# Patient Record
Sex: FEMALE | Race: OTHER | Employment: UNEMPLOYED | URBAN - METROPOLITAN AREA
[De-identification: names, ages, dates, MRNs, and addresses within clinical notes are randomized per-mention and may not be internally consistent; named-entity substitution may affect disease eponyms.]

---

## 2022-03-20 PROBLEM — M16.12 ARTHRITIS OF LEFT HIP: Status: ACTIVE | Noted: 2018-09-24

## 2024-01-11 ENCOUNTER — TELEPHONE (OUTPATIENT)
Dept: PRIMARY CARE CLINIC | Age: 89
End: 2024-01-11

## 2024-01-11 ENCOUNTER — OFFICE VISIT (OUTPATIENT)
Dept: PRIMARY CARE CLINIC | Age: 89
End: 2024-01-11
Payer: MEDICARE

## 2024-01-11 VITALS
TEMPERATURE: 97.8 F | HEIGHT: 66 IN | RESPIRATION RATE: 16 BRPM | DIASTOLIC BLOOD PRESSURE: 64 MMHG | BODY MASS INDEX: 21.49 KG/M2 | HEART RATE: 66 BPM | SYSTOLIC BLOOD PRESSURE: 134 MMHG | OXYGEN SATURATION: 96 % | WEIGHT: 133.7 LBS

## 2024-01-11 DIAGNOSIS — M16.12 ARTHRITIS OF LEFT HIP: Chronic | ICD-10-CM

## 2024-01-11 DIAGNOSIS — L91.8 ACROCHORDON: ICD-10-CM

## 2024-01-11 DIAGNOSIS — S81.802A WOUND OF LEFT LOWER EXTREMITY, INITIAL ENCOUNTER: ICD-10-CM

## 2024-01-11 DIAGNOSIS — Z91.81 AT HIGH RISK FOR FALLS: ICD-10-CM

## 2024-01-11 DIAGNOSIS — Z71.89 HEARING AID CONSULTATION: ICD-10-CM

## 2024-01-11 DIAGNOSIS — R54 FRAIL ELDERLY: ICD-10-CM

## 2024-01-11 DIAGNOSIS — I10 ESSENTIAL HYPERTENSION: Chronic | ICD-10-CM

## 2024-01-11 DIAGNOSIS — I48.20 CHRONIC ATRIAL FIBRILLATION (HCC): Primary | Chronic | ICD-10-CM

## 2024-01-11 PROBLEM — M54.50 LOW BACK PAIN: Status: ACTIVE | Noted: 2020-07-06

## 2024-01-11 PROBLEM — Z96.642 PRESENCE OF LEFT ARTIFICIAL HIP JOINT: Status: ACTIVE | Noted: 2018-09-27

## 2024-01-11 PROBLEM — S00.83XA CONTUSION OF FACE: Status: ACTIVE | Noted: 2021-12-15

## 2024-01-11 PROBLEM — R00.0 TACHYCARDIA: Status: RESOLVED | Noted: 2022-08-29 | Resolved: 2024-01-11

## 2024-01-11 PROBLEM — R42 DIZZINESS AND GIDDINESS: Status: ACTIVE | Noted: 2020-07-06

## 2024-01-11 PROBLEM — M25.512 ACUTE PAIN OF LEFT SHOULDER: Status: RESOLVED | Noted: 2020-02-04 | Resolved: 2024-01-11

## 2024-01-11 PROBLEM — M25.512 ACUTE PAIN OF LEFT SHOULDER: Status: ACTIVE | Noted: 2020-02-04

## 2024-01-11 PROBLEM — S00.83XA CONTUSION OF FACE: Status: RESOLVED | Noted: 2021-12-15 | Resolved: 2024-01-11

## 2024-01-11 PROBLEM — R00.0 TACHYCARDIA: Status: ACTIVE | Noted: 2022-08-29

## 2024-01-11 PROBLEM — R42 DIZZINESS AND GIDDINESS: Status: RESOLVED | Noted: 2020-07-06 | Resolved: 2024-01-11

## 2024-01-11 PROBLEM — M54.50 LOW BACK PAIN: Chronic | Status: ACTIVE | Noted: 2020-07-06

## 2024-01-11 PROCEDURE — 1090F PRES/ABSN URINE INCON ASSESS: CPT | Performed by: FAMILY MEDICINE

## 2024-01-11 PROCEDURE — 1123F ACP DISCUSS/DSCN MKR DOCD: CPT | Performed by: FAMILY MEDICINE

## 2024-01-11 PROCEDURE — G8420 CALC BMI NORM PARAMETERS: HCPCS | Performed by: FAMILY MEDICINE

## 2024-01-11 PROCEDURE — 1036F TOBACCO NON-USER: CPT | Performed by: FAMILY MEDICINE

## 2024-01-11 PROCEDURE — G8427 DOCREV CUR MEDS BY ELIG CLIN: HCPCS | Performed by: FAMILY MEDICINE

## 2024-01-11 PROCEDURE — 99204 OFFICE O/P NEW MOD 45 MIN: CPT | Performed by: FAMILY MEDICINE

## 2024-01-11 PROCEDURE — G8482 FLU IMMUNIZE ORDER/ADMIN: HCPCS | Performed by: FAMILY MEDICINE

## 2024-01-11 RX ORDER — AMIODARONE HYDROCHLORIDE 200 MG/1
200 TABLET ORAL DAILY
COMMUNITY
End: 2024-01-11 | Stop reason: SDUPTHER

## 2024-01-11 RX ORDER — DOCUSATE SODIUM 100 MG/1
100 CAPSULE, LIQUID FILLED ORAL 2 TIMES DAILY
COMMUNITY
Start: 2023-12-20 | End: 2024-01-11 | Stop reason: SDUPTHER

## 2024-01-11 RX ORDER — METOPROLOL SUCCINATE 25 MG/1
25 TABLET, EXTENDED RELEASE ORAL DAILY
Qty: 90 TABLET | Refills: 1 | Status: SHIPPED | OUTPATIENT
Start: 2024-01-11

## 2024-01-11 RX ORDER — RIVAROXABAN 20 MG/1
20 TABLET, FILM COATED ORAL DAILY
COMMUNITY
Start: 2022-08-30 | End: 2024-01-11 | Stop reason: SDUPTHER

## 2024-01-11 RX ORDER — DILTIAZEM HYDROCHLORIDE 120 MG/1
120 TABLET, FILM COATED ORAL 2 TIMES DAILY
Qty: 120 TABLET | Refills: 2 | Status: SHIPPED | OUTPATIENT
Start: 2024-01-11 | End: 2024-01-11 | Stop reason: ALTCHOICE

## 2024-01-11 RX ORDER — RIVAROXABAN 20 MG/1
20 TABLET, FILM COATED ORAL DAILY
Qty: 90 TABLET | Refills: 1 | Status: SHIPPED | OUTPATIENT
Start: 2024-01-11

## 2024-01-11 RX ORDER — DOCUSATE SODIUM 100 MG/1
100 CAPSULE, LIQUID FILLED ORAL 2 TIMES DAILY
Qty: 60 CAPSULE | Refills: 2 | Status: SHIPPED | OUTPATIENT
Start: 2024-01-11

## 2024-01-11 RX ORDER — AMIODARONE HYDROCHLORIDE 200 MG/1
200 TABLET ORAL DAILY
Qty: 90 TABLET | Refills: 1 | Status: SHIPPED | OUTPATIENT
Start: 2024-01-11

## 2024-01-11 RX ORDER — TRAMADOL HYDROCHLORIDE 50 MG/1
50 TABLET ORAL EVERY 8 HOURS PRN
COMMUNITY

## 2024-01-11 RX ORDER — DILTIAZEM HYDROCHLORIDE 120 MG/1
120 TABLET, FILM COATED ORAL 2 TIMES DAILY
COMMUNITY
Start: 2023-12-20 | End: 2024-01-11 | Stop reason: SDUPTHER

## 2024-01-11 RX ORDER — CEPHALEXIN 500 MG/1
500 CAPSULE ORAL 2 TIMES DAILY
Qty: 10 CAPSULE | Refills: 0 | Status: SHIPPED | OUTPATIENT
Start: 2024-01-11 | End: 2024-01-16

## 2024-01-11 RX ORDER — METOPROLOL SUCCINATE 25 MG/1
25 TABLET, EXTENDED RELEASE ORAL DAILY
COMMUNITY
Start: 2022-08-30 | End: 2024-01-11 | Stop reason: SDUPTHER

## 2024-01-11 RX ORDER — DILTIAZEM HYDROCHLORIDE 120 MG/1
120 CAPSULE, COATED, EXTENDED RELEASE ORAL DAILY
COMMUNITY
End: 2024-01-11

## 2024-01-11 RX ORDER — DILTIAZEM HYDROCHLORIDE 120 MG/1
120 CAPSULE, EXTENDED RELEASE ORAL DAILY
Qty: 90 CAPSULE | Refills: 1 | Status: SHIPPED | OUTPATIENT
Start: 2024-01-11

## 2024-01-11 SDOH — ECONOMIC STABILITY: HOUSING INSECURITY
IN THE LAST 12 MONTHS, WAS THERE A TIME WHEN YOU DID NOT HAVE A STEADY PLACE TO SLEEP OR SLEPT IN A SHELTER (INCLUDING NOW)?: NO

## 2024-01-11 SDOH — ECONOMIC STABILITY: INCOME INSECURITY: HOW HARD IS IT FOR YOU TO PAY FOR THE VERY BASICS LIKE FOOD, HOUSING, MEDICAL CARE, AND HEATING?: NOT HARD AT ALL

## 2024-01-11 SDOH — ECONOMIC STABILITY: FOOD INSECURITY: WITHIN THE PAST 12 MONTHS, YOU WORRIED THAT YOUR FOOD WOULD RUN OUT BEFORE YOU GOT MONEY TO BUY MORE.: NEVER TRUE

## 2024-01-11 SDOH — ECONOMIC STABILITY: FOOD INSECURITY: WITHIN THE PAST 12 MONTHS, THE FOOD YOU BOUGHT JUST DIDN'T LAST AND YOU DIDN'T HAVE MONEY TO GET MORE.: NEVER TRUE

## 2024-01-11 ASSESSMENT — PATIENT HEALTH QUESTIONNAIRE - PHQ9
SUM OF ALL RESPONSES TO PHQ QUESTIONS 1-9: 1
SUM OF ALL RESPONSES TO PHQ9 QUESTIONS 1 & 2: 1
SUM OF ALL RESPONSES TO PHQ QUESTIONS 1-9: 1
SUM OF ALL RESPONSES TO PHQ QUESTIONS 1-9: 1
2. FEELING DOWN, DEPRESSED OR HOPELESS: 1
1. LITTLE INTEREST OR PLEASURE IN DOING THINGS: 0
SUM OF ALL RESPONSES TO PHQ QUESTIONS 1-9: 1

## 2024-01-11 ASSESSMENT — ENCOUNTER SYMPTOMS
SHORTNESS OF BREATH: 0
DIARRHEA: 0
SORE THROAT: 0
ABDOMINAL PAIN: 0
CHEST TIGHTNESS: 0
RHINORRHEA: 0
CONSTIPATION: 0

## 2024-01-11 NOTE — PROGRESS NOTES
Subjective:   Patient ID: Roxanne Best is a 89 y.o. female here today to establish care. Previously under the care of [facility].  HPI by clinical support staff:   Chief Complaint   Patient presents with    New Patient      Preliminary data above this line collected by clinical support staff.    ______________________________________________________________________  HPI by Provider:   HPI   Patient presents to establish care.  History reviewed and updated with patient today.  Patient brought in by  sister and brother in law to establish care- moved to be closer to family.  Diagnosis of atrial fibrillation and hypertension- both well controlled.   Fell  2 days ago while turning around with her walker and has a laceration on her shin - left.  Chronic low back pain from arthritis.   Data above this line collected by Provider.    Patient's medications, allergies, past medical, surgical, social and family histories were reviewed and updated as appropriate.  Patient Care Team:  Cheryl James MD as PCP - General (Family Medicine)    No Known Allergies    Current Outpatient Medications on File Prior to Visit   Medication Sig Dispense Refill    traMADol (ULTRAM) 50 MG tablet Take 1 tablet by mouth every 8 hours as needed for Pain.       No current facility-administered medications on file prior to visit.       Review of Systems   Constitutional:  Negative for activity change, appetite change, fatigue and fever.   HENT:  Negative for congestion, rhinorrhea and sore throat.    Respiratory:  Negative for chest tightness and shortness of breath.    Cardiovascular:  Negative for chest pain, palpitations and leg swelling.   Gastrointestinal:  Negative for abdominal pain, constipation and diarrhea.   Genitourinary:  Negative for dysuria and frequency.   Musculoskeletal:  Negative for arthralgias.   Neurological:  Negative for dizziness, weakness and headaches.   Psychiatric/Behavioral:  Negative for hallucinations.    All

## 2024-01-11 NOTE — TELEPHONE ENCOUNTER
Pharmacy called in regarding the medication dilTiazem tablets that you ordered will not give the long lasting coverage, so  the pharmacist said that looking back at her previous record they had been giving her the CD capsule Extended release that gave her the lasting coverage that she needs.

## 2024-01-12 ENCOUNTER — TELEPHONE (OUTPATIENT)
Dept: PRIMARY CARE CLINIC | Age: 89
End: 2024-01-12

## 2024-01-12 NOTE — TELEPHONE ENCOUNTER
[facility] pharmacy called stating that the prescription that was re-sent was received for:  dilTIAZem (DILACOR XR) 120 MG extended release capsule  But was sent for 1 tab daily, where prior script was for 1 tab BID.   Please advise

## 2024-01-16 ENCOUNTER — TELEPHONE (OUTPATIENT)
Dept: PRIMARY CARE CLINIC | Age: 89
End: 2024-01-16

## 2024-01-16 ENCOUNTER — CARE COORDINATION (OUTPATIENT)
Dept: CARE COORDINATION | Age: 89
End: 2024-01-16

## 2024-01-16 DIAGNOSIS — G89.29 CHRONIC BILATERAL LOW BACK PAIN WITHOUT SCIATICA: Primary | Chronic | ICD-10-CM

## 2024-01-16 DIAGNOSIS — R54 FRAIL ELDERLY: ICD-10-CM

## 2024-01-16 DIAGNOSIS — M54.50 CHRONIC BILATERAL LOW BACK PAIN WITHOUT SCIATICA: Primary | Chronic | ICD-10-CM

## 2024-01-16 SDOH — ECONOMIC STABILITY: HOUSING INSECURITY: IN THE LAST 12 MONTHS, HOW MANY PLACES HAVE YOU LIVED?: 2

## 2024-01-16 SDOH — ECONOMIC STABILITY: TRANSPORTATION INSECURITY
IN THE PAST 12 MONTHS, HAS THE LACK OF TRANSPORTATION KEPT YOU FROM MEDICAL APPOINTMENTS OR FROM GETTING MEDICATIONS?: NO

## 2024-01-16 SDOH — ECONOMIC STABILITY: TRANSPORTATION INSECURITY
IN THE PAST 12 MONTHS, HAS LACK OF TRANSPORTATION KEPT YOU FROM MEETINGS, WORK, OR FROM GETTING THINGS NEEDED FOR DAILY LIVING?: NO

## 2024-01-16 SDOH — ECONOMIC STABILITY: INCOME INSECURITY: IN THE LAST 12 MONTHS, WAS THERE A TIME WHEN YOU WERE NOT ABLE TO PAY THE MORTGAGE OR RENT ON TIME?: NO

## 2024-01-16 NOTE — TELEPHONE ENCOUNTER
Pt would like advice on where she should get home health care services    Please call back and advise

## 2024-01-16 NOTE — TELEPHONE ENCOUNTER
Referral/last office note/med list/allergy list/insurance cards have been faxed over to the number provided

## 2024-01-16 NOTE — TELEPHONE ENCOUNTER
Spoke with Josee.  I informed that she can contact the insurance to find out who she would need to use and to let us know if they needs orders/notes faxed to them.

## 2024-01-16 NOTE — CARE COORDINATION
Ambulatory Care Coordination Note  2024    Patient Current Location:  Home: [address] [place] [address].  [facility] [id number]    ACM contacted the family by telephone. Verified name and  with family as identifiers. Provided introduction to self, and explanation of the ACM role.     ACM: Anai Medley RN    Challenges to be reviewed by the provider   Additional needs identified to be addressed with provider: Yes  Patient family request HHC order be sent to [facility] in [place]. Please have office send order, kacie, jose m to [phone].     Please mail HIPAA from to patient so we are able to speak with her sister Josee. Verbal taken for this call today           Method of communication with provider: chart routing.  ACM outreach to patient to introduce her to care coordination and the role of the ACM. Patient notes she prefers her sister Josee to speak with ACM. No current HIPAA on file. Verbal to speak with sister. Will have office mail HIPAA form to patients address to be completed by patient.     Patient is a 89 y.o. who recently moved in with family after being at [facility]. Patient has had many falls recently and has a wound on her shin. Patient is noted to be weak and unable to preform ADL's. iADLs independently.  The family has inquired about HHC, PT, OT, SN and HHA. The outreached [facility] in [place] and provided ACM the fax number for the office to send order. AC discussed with patients sister Josee that if they do no accept the patients insurance are they open to other [facility] agency's. Patient sister notes she is open to other referrals if [facility] can not accept patient.   Josee also notes that patient is currently not active with any senior services, she is open to a referral for senior services.   She notes patient does have living will but only her son who lives in [place] is on this. Will have  outreach to discuss the need to have updated forms and referral to

## 2024-01-16 NOTE — CARE COORDINATION
Per [facility] patient is looking for someone to repair walker/rollator that is only 1 year old.    Per notes in Chart Rollator may have come from [facility]. July 2023  Called [facility] to see if Rollator could be repaired or replaced by them. Spoke with Kortney. Family/patient would need to bring into office in [place] to have them take a look at it to see if they could repair. She said the rollator was received in Dec 2023.    [place] [id number] [phone]  Fax [phone]      Routed to [facility].

## 2024-01-18 ENCOUNTER — CARE COORDINATION (OUTPATIENT)
Dept: CARE COORDINATION | Age: 89
End: 2024-01-18

## 2024-01-18 NOTE — CARE COORDINATION
CHANDNI placed call to patient's sister, Josee, to discuss community resource needs.   Josee confirmed that patient would like to update AD's. CHANDNI to send mail request to [facility] on this date. CHANDNI to assist in completion and answer questions as needed.     Josee stated patient's primary needs are for skilled services such as PT and nursing for wound care. She stated she is expecting a call from someone at [facility] between 430-5 PM today to schedule start of care.   Josee reports that patient does not need help with personal care tasks such as bathing or dressing herself, but does need help with foot/toenail care. She is in agreement for this worker to make a referral to [facility] for assistance with grooming and homemaking tasks. Referral ref # [id number].    CHANDNI provided Josee with the contact number for [facility], as noted by [facility], to discuss repair of walker.     CHANDNI to follow up with patient/Josee next week regarding outcome of [facility] referral and receipt of AD's.      Princess Medley MSW, LSW     [phone]

## 2024-01-23 ENCOUNTER — OFFICE VISIT (OUTPATIENT)
Dept: CARDIOLOGY CLINIC | Age: 89
End: 2024-01-23
Payer: MEDICARE

## 2024-01-23 VITALS
BODY MASS INDEX: 20.5 KG/M2 | SYSTOLIC BLOOD PRESSURE: 124 MMHG | HEART RATE: 54 BPM | DIASTOLIC BLOOD PRESSURE: 70 MMHG | WEIGHT: 127 LBS

## 2024-01-23 DIAGNOSIS — Z95.0 PACEMAKER: ICD-10-CM

## 2024-01-23 DIAGNOSIS — I48.20 CHRONIC ATRIAL FIBRILLATION (HCC): Primary | Chronic | ICD-10-CM

## 2024-01-23 DIAGNOSIS — I10 ESSENTIAL HYPERTENSION: Chronic | ICD-10-CM

## 2024-01-23 PROCEDURE — 1123F ACP DISCUSS/DSCN MKR DOCD: CPT | Performed by: INTERNAL MEDICINE

## 2024-01-23 PROCEDURE — G8482 FLU IMMUNIZE ORDER/ADMIN: HCPCS | Performed by: INTERNAL MEDICINE

## 2024-01-23 PROCEDURE — 1090F PRES/ABSN URINE INCON ASSESS: CPT | Performed by: INTERNAL MEDICINE

## 2024-01-23 PROCEDURE — G8420 CALC BMI NORM PARAMETERS: HCPCS | Performed by: INTERNAL MEDICINE

## 2024-01-23 PROCEDURE — 93000 ELECTROCARDIOGRAM COMPLETE: CPT | Performed by: INTERNAL MEDICINE

## 2024-01-23 PROCEDURE — 99204 OFFICE O/P NEW MOD 45 MIN: CPT | Performed by: INTERNAL MEDICINE

## 2024-01-23 PROCEDURE — G8427 DOCREV CUR MEDS BY ELIG CLIN: HCPCS | Performed by: INTERNAL MEDICINE

## 2024-01-23 PROCEDURE — 1036F TOBACCO NON-USER: CPT | Performed by: INTERNAL MEDICINE

## 2024-01-23 ASSESSMENT — ENCOUNTER SYMPTOMS
VOMITING: 0
CHEST TIGHTNESS: 0
COUGH: 0
BACK PAIN: 0
COLOR CHANGE: 0
EYE DISCHARGE: 0
SHORTNESS OF BREATH: 0
BLOOD IN STOOL: 0
FACIAL SWELLING: 0
WHEEZING: 0
ABDOMINAL DISTENTION: 0
ABDOMINAL PAIN: 0

## 2024-01-23 NOTE — PROGRESS NOTES
140/60 124/70   Site: Left Upper Arm    Position: Sitting    Cuff Size: Medium Adult    Pulse: 60 54   Weight: 57.6 kg (127 lb)        BP Readings from Last 3 Encounters:   01/23/24 124/70   01/11/24 134/64       Wt Readings from Last 3 Encounters:   01/23/24 57.6 kg (127 lb)   01/11/24 60.6 kg (133 lb 11.2 oz)       Physical Exam  Constitutional:       General: She is not in acute distress.     Appearance: She is well-developed. She is not diaphoretic.   HENT:      Head: Normocephalic and atraumatic.   Eyes:      Pupils: Pupils are equal, round, and reactive to light.   Neck:      Thyroid: No thyromegaly.      Vascular: No JVD.   Cardiovascular:      Rate and Rhythm: Normal rate and regular rhythm.      Chest Wall: PMI is not displaced.      Heart sounds: Normal heart sounds, S1 normal and S2 normal. No murmur heard.     No friction rub. No gallop.   Pulmonary:      Effort: Pulmonary effort is normal. No respiratory distress.      Breath sounds: Normal breath sounds. No stridor. No wheezing or rales.   Chest:      Chest wall: No tenderness.   Abdominal:      General: Bowel sounds are normal. There is no distension.      Palpations: Abdomen is soft.      Tenderness: There is no abdominal tenderness. There is no guarding or rebound.   Musculoskeletal:         General: No tenderness. Normal range of motion.      Cervical back: Normal range of motion.   Lymphadenopathy:      Cervical: No cervical adenopathy.   Skin:     General: Skin is warm and dry.      Findings: No erythema or rash.   Neurological:      Mental Status: She is alert and oriented to person, place, and time.      Coordination: Coordination normal.   Psychiatric:         Behavior: Behavior normal.         Thought Content: Thought content normal.         Judgment: Judgment normal.         Labs:       No results found for: \"WBC\", \"HGB\", \"HCT\", \"MCV\", \"PLT\"  No results found for: \"NA\", \"K\", \"CL\", \"CO2\", \"BUN\", \"CREATININE\", \"GLUCOSE\", \"CALCIUM\", \"PROT\",

## 2024-01-23 NOTE — ASSESSMENT & PLAN NOTE
Doing well on Xarelto.  No bleeding.  No falls.  Heart rate is controlled on metoprolol diltiazem and amiodarone.  Pacemaker working properly.

## 2024-01-24 ENCOUNTER — CARE COORDINATION (OUTPATIENT)
Dept: CASE MANAGEMENT | Age: 89
End: 2024-01-24

## 2024-01-24 NOTE — CARE COORDINATION
Ambulatory Care Coordination Note  1/24/2024      Attempted to contact patient for follow up transition call. Left voicemail message to return call with an update on condition since discharge. Contact information provided. Will continue to follow up.      Lab Results       None            Care Coordination Interventions    Referral from Primary Care Provider: Yes  Suggested Interventions and Community Resources  Home Health Services: In Process  Meals on Wheels: Declined  Medication Assistance Program: Declined  Medi Set or Pill Pack: Declined  Occupational Therapy: In Process  Palliative Care: Not Started  Physical Therapy: In Process  Senior Services: In Process  Social Work: In Process  Zone Management Tools: In Process (Comment: HTN)          Goals Addressed    None         Future Appointments   Date Time Provider Department Center   2/23/2024  1:00 PM Carmen Smith AuD KW AUDIO [facility]   3/11/2024  1:30 PM SCHEDULE, RANDALL DEVICE CHECK [facility]   3/11/2024  2:00 PM Kd Haines MD [place] [facility]   3/26/2024 11:00 AM Cheryl James MD [facility] PC & PEDS [place] - [facility]   1/28/2025 12:00 PM Adam Karimi MD [place] CARDIO [facility]       Lea Rush LPJOANIE  Care Coordinator  [phone]

## 2024-01-27 DIAGNOSIS — M16.12 ARTHRITIS OF LEFT HIP: Chronic | ICD-10-CM

## 2024-01-29 ENCOUNTER — CARE COORDINATION (OUTPATIENT)
Dept: CARE COORDINATION | Age: 89
End: 2024-01-29

## 2024-01-29 RX ORDER — TRAMADOL HYDROCHLORIDE 50 MG/1
50 TABLET ORAL 2 TIMES DAILY
Qty: 60 TABLET | Refills: 0 | Status: SHIPPED | OUTPATIENT
Start: 2024-01-29 | End: 2024-02-28

## 2024-01-29 NOTE — CARE COORDINATION
CHANDNI placed follow-up call to Josee to touch base regarding [facility] referral and receipt of AD packet.  A niece of the family, Rosemarie [phone], answered Josee's phone stating that Josee passed away last night. Rosemarie is Josee's daughter and she stated that Josee's phone will be managed by her spouse, Dino. They intend to keep this number active.     Rosemarie will follow-up with Bill regarding AD packet when able. Patient's HHC SOC was scheduled for this week, but was delayed due to Josee's passing.  SW to research traveling podiatrists to see patient at home to complete foot/toenail care.    SW offered condolences and encouraged her to let this worker know if patient/family needs anything.     CHANDNI located podiatrist options and left a voicemail with information:  Dr Jaqui Castañeda [phone]   [facility] [phone] Dr. Amanda Medley MSW, LSW     [phone]

## 2024-02-09 ENCOUNTER — CARE COORDINATION (OUTPATIENT)
Dept: CARE COORDINATION | Age: 89
End: 2024-02-09

## 2024-02-09 NOTE — CARE COORDINATION
CHANDNI placed follow-up call to Dino who stated he is unsure if AD packet was received. He stated that patient's niece will likely take over POA as she is local and patient's son is not. He is unsure if [facility] has reached out. He took this worker's number down and will have his daughter, Rosemarie, reach out to this worker.     Princess Medley MSW, LSW     [phone]

## 2024-02-16 ENCOUNTER — CARE COORDINATION (OUTPATIENT)
Dept: CARE COORDINATION | Age: 89
End: 2024-02-16

## 2024-02-20 ENCOUNTER — CARE COORDINATION (OUTPATIENT)
Dept: CARE COORDINATION | Age: 89
End: 2024-02-20

## 2024-02-20 NOTE — CARE COORDINATION
CHANDNI placed follow up call to Dino who stated patient is doing well. They located a podiatrist in [place] to care for patient. She has an ear appointment this Friday. [facility] services have started and the therapist is scheduled to come out today.   Dino stated that his daughter Rosemarie is coordinating POA paperwork with patient's son who lives in [place]. Dino reports no questions or concerns at this time. CHANDNI encouraged him to call if any needs arise. Will sign off.    Princess Medley MSW, LSW     [phone]

## 2024-02-22 ENCOUNTER — CARE COORDINATION (OUTPATIENT)
Dept: CARE COORDINATION | Age: 89
End: 2024-02-22

## 2024-02-22 NOTE — CARE COORDINATION
Ambulatory Care Coordination Note  2024    Patient Current Location:  Home: [address] [place] [address].  [facility] [address]     ACM contacted the patient by telephone. Verified name and  with patient as identifiers. Provided introduction to self, and explanation of the ACM role.     Challenges to be reviewed by the provider   Additional needs identified to be addressed with provider: No  none               Method of communication with provider: none.    ACM: Elizabeth Santos RN    ACM outreach made. Spoke with patient's son who states that patient is doing well. HHC was there today and VSS were stable. Bill, patient's son, states that PT comes in once a week. States that everyone ([facility]/senior services) has been in contact with them. States no needs or concerns at this time. Audiology appt tomorrow. Cardiologist appt 3/11/24.    Plan   Follow up 1-2 weeks  Assess needs    Offered patient enrollment in the Remote Patient Monitoring (RPM) program for in-home monitoring: Patient declined.    Lab Results       None            Care Coordination Interventions    Referral from Primary Care Provider: Yes  Suggested Interventions and Community Resources  Home Health Services: In Process  Meals on Wheels: Declined  Medication Assistance Program: Declined  Medi Set or Pill Pack: Declined  Occupational Therapy: In Process  Palliative Care: Not Started  Physical Therapy: In Process  Senior Services: In Process  Social Work: In Process  Zone Management Tools: In Process (Comment: HTN)          Goals Addressed    None         Future Appointments   Date Time Provider Department Center   2024  1:00 PM Carmen Smith AuD KW AUDIO [facility]   3/11/2024  1:30 PM SCHEDULE, RANDALL DEVICE CHECK [place] [facility]   3/11/2024  2:00 PM Kd Haines MD [place] [facility]   3/26/2024 11:00 AM Cheryl James MD WC PC & PEDS [place] - DYD   2025 12:00 PM Adam Karimi MD [place] CARDIO [facility]   ,   Hypertension - Encounter

## 2024-02-23 ENCOUNTER — PROCEDURE VISIT (OUTPATIENT)
Dept: AUDIOLOGY | Age: 89
End: 2024-02-23

## 2024-02-23 DIAGNOSIS — H90.3 SENSORINEURAL HEARING LOSS (SNHL) OF BOTH EARS: Primary | ICD-10-CM

## 2024-02-23 NOTE — PROGRESS NOTES
Roxanne Best   10/31/1925, 89 y.o. female   [id number]       Referring Provider: Cheryl James MD   Referral Type: In an order in Epic    Reason for Visit: Evaluation of suspected change in hearing, tinnitus, or balance.      ADULT AUDIOLOGIC EVALUATION      Roxanne Best is a 89 y.o. female seen today, 2/23/2024, for an initial audiologic evaluation.  Patient was seen accompanied by her niece.    AUDIOLOGIC AND OTHER PERTINENT MEDICAL HISTORY:        Roxanne Best noted decreased hearing bilaterally, gradual; she noted concern for ear wax and was under the impression this would be removed today; some fullness in ears, believes it may be wax; she does use a walker to assist with ambulation, has a history of hip fracture two years ago.      Roxanne eBst denied otalgia and otorrhea.    IMPRESSIONS:       Today's results are consistent with bilateral sensorineural hearing loss; right ear with excellent  word recognition and normal middle ear function and left ear with good word recognition and reduced TM mobility.  Hearing loss is significant enough to result in difficulty understanding speech in most listening environments.  Recommended ear cleaning with ENT and hearing aid evaluation.  She plans to discuss hearing aids with a family member that dispenses hearing aids.    ASSESSMENT AND FINDINGS:       Otoscopy revealed: RE>LE cerumen, could not visualize right TM.  At completion of testing, patient vocalized consent and attempted cerumen removal utilizing a curette today.  She did not tolerate this well and was discontinued.  Recommended formal ear cleaning with ENT.      RIGHT EAR:  Hearing Sensitivity: Mild through 1000 Hz steeply sloping to profound sensorineural hearing loss with no response at the limits of the equipment 3926-9883 Hz.  Speech Recognition Threshold: 45 dBHL  Word Recognition: Excellent (92%), based on NU-6 25-word list at 80 dBHL, masked, using recorded speech stimuli.    Tympanometry:

## 2024-02-23 NOTE — PATIENT INSTRUCTIONS
back and forth on the telephone instead of talking or listening. These devices are also called TDD. When messages are typed on the keyboard, they are sent over the phone line to a receiving TTY. The message is shown on a monitor.  Use pagers, fax machines, text, and email if it is hard for you to communicate by telephone.  Try to learn a listening technique called speech-reading. It is not lip-reading. You pay attention to people's gestures, expressions, posture, and tone of voice. These clues can help you understand what a person is saying. Face the person you are talking to, and have him or her face you. Make sure the lighting is good. You need to see the other person's face clearly.  Think about counseling if you need help to adjust to your hearing loss.    When should you call for help?  Watch closely for changes in your health, and be sure to contact your doctor if:    You think your hearing is getting worse.     You have new symptoms, such as dizziness or nausea.

## 2024-02-27 NOTE — PROGRESS NOTES
[facility]   Cardiac Electrophysiology Consultation   Date: 3/11/2024  Reason for Consultation:   Consult Requesting Physician: No att. providers found     Chief Complaint:   Chief Complaint   Patient presents with    New Patient     Establish care         HPI: Roxanne Best is a 89 y.o. female referred by Dr. Karimi to establish care with EP. PMH significant for HTN, depression, arthritis, frequent falls, SSS, s/p dual chamber MDT PPM (7/12/23, Dr. Bansal), chronic AF on Xarelto and amiodarone.    Interval History:   Today, she is here to establish care with EP for device management and AF as she recently moved from [place], [place]. She feels well overall. Denies complaints of palpitations, dizziness, CP, SOB, orthopnea, BLE swelling, or syncope. She takes her medications without side effects. She fell recently and broke her femur, using a walker for now. She reports having a couple of DCCVs in the past.    All sensing and pacing parameters appear stable since 08.25.2023. 12.2 yrs estimated until CONRADO/RRT. Underlying AsVs, SB, 40s. AP 95.5%  9.5%. PVC 0.2/hr.   She declines remote monitoring and wants OV checks only.    Assessment:  Chronic AF, on Xarelto, amiodarone, Toprol, diltiazem  SSS, s/p dual chamber PPM  HTN, stable on Toprol, diltiazem, follows Dr. Karimi    Plan:  No changes to current medications or with device settings  Will plan for in office device checks every 3 months  Continue to f/u with Dr. Karimi as planned  Follow up with EP NP in 6 months    Past Medical History:   Diagnosis Date    A-fib (HCC)     Allergic rhinitis     Depression     Hypertension         Past Surgical History:   Procedure Laterality Date    JOINT REPLACEMENT  2019       Allergies:  No Known Allergies    Medication:   Prior to Admission medications    Medication Sig Start Date End Date Taking? Authorizing Provider   traMADol (ULTRAM) 50 MG tablet Take 1 tablet by mouth every 6 hours as needed for Pain for up to

## 2024-03-06 ENCOUNTER — CARE COORDINATION (OUTPATIENT)
Dept: CARE COORDINATION | Age: 89
End: 2024-03-06

## 2024-03-06 ENCOUNTER — OFFICE VISIT (OUTPATIENT)
Dept: PRIMARY CARE CLINIC | Age: 89
End: 2024-03-06
Payer: MEDICARE

## 2024-03-06 VITALS
RESPIRATION RATE: 16 BRPM | HEIGHT: 66 IN | BODY MASS INDEX: 20.89 KG/M2 | HEART RATE: 67 BPM | TEMPERATURE: 98.2 F | SYSTOLIC BLOOD PRESSURE: 122 MMHG | OXYGEN SATURATION: 96 % | DIASTOLIC BLOOD PRESSURE: 74 MMHG | WEIGHT: 130 LBS

## 2024-03-06 DIAGNOSIS — G89.29 CHRONIC THORACIC BACK PAIN, UNSPECIFIED BACK PAIN LATERALITY: ICD-10-CM

## 2024-03-06 DIAGNOSIS — Z00.00 INITIAL MEDICARE ANNUAL WELLNESS VISIT: Primary | ICD-10-CM

## 2024-03-06 DIAGNOSIS — M54.6 CHRONIC THORACIC BACK PAIN, UNSPECIFIED BACK PAIN LATERALITY: ICD-10-CM

## 2024-03-06 PROCEDURE — G8482 FLU IMMUNIZE ORDER/ADMIN: HCPCS | Performed by: NURSE PRACTITIONER

## 2024-03-06 PROCEDURE — 1123F ACP DISCUSS/DSCN MKR DOCD: CPT | Performed by: NURSE PRACTITIONER

## 2024-03-06 PROCEDURE — G0438 PPPS, INITIAL VISIT: HCPCS | Performed by: NURSE PRACTITIONER

## 2024-03-06 RX ORDER — TRAMADOL HYDROCHLORIDE 50 MG/1
50 TABLET ORAL EVERY 6 HOURS PRN
Qty: 12 TABLET | Refills: 0 | Status: SHIPPED | OUTPATIENT
Start: 2024-03-06 | End: 2024-04-05

## 2024-03-06 ASSESSMENT — PATIENT HEALTH QUESTIONNAIRE - PHQ9
SUM OF ALL RESPONSES TO PHQ QUESTIONS 1-9: 0
SUM OF ALL RESPONSES TO PHQ9 QUESTIONS 1 & 2: 0
1. LITTLE INTEREST OR PLEASURE IN DOING THINGS: 0
SUM OF ALL RESPONSES TO PHQ QUESTIONS 1-9: 0
2. FEELING DOWN, DEPRESSED OR HOPELESS: 0

## 2024-03-06 ASSESSMENT — LIFESTYLE VARIABLES
HOW MANY STANDARD DRINKS CONTAINING ALCOHOL DO YOU HAVE ON A TYPICAL DAY: PATIENT DOES NOT DRINK
HOW OFTEN DO YOU HAVE A DRINK CONTAINING ALCOHOL: NEVER

## 2024-03-06 NOTE — CARE COORDINATION
ACM outreach made with no answer. Unable to leave VM as mailbox is full. Will attempt at a later date/time.

## 2024-03-06 NOTE — PROGRESS NOTES
Patient Care Team:  Cheryl James MD as PCP - General (Family Medicine)  Cheryl James MD as PCP - Empaneled Provider  Anai Medley, RN as Ambulatory Care Manager  Elizabeth Santos, RN as Registered Nurse     Reviewed and updated this visit:  Tobacco  Allergies  Meds  Problems  Med Hx  Surg Hx  Soc Hx  Fam Hx

## 2024-03-06 NOTE — PATIENT INSTRUCTIONS
and tests that look at your brain.  These questions and tests can make sure you don't have other conditions that can cause symptoms like MCI. These include depression, sleep problems, and side effects from medicines.  How is it treated?  There are no medicines to treat MCI or to keep it from progressing to dementia. But treating conditions like high blood pressure and diabetes may help. A person with MCI needs routine follow-up visits with their doctor to check on changes in the person's mental skills.  How can you care for yourself at home?  Keeping your body active can help slow MCI. Exercises like walking can help. Try to stay active mentally too. Read or do things like crossword puzzles if you enjoy doing them.  If you need help coping with MCI, you may want to get support from family, friends, a support group, or a counselor who works with people who have MCI.  Though the future isn't always clear, it can be good to plan ahead with instructions for your care. These are called advanced directives. Having a plan can help make sure that you get the care you want.  Current as of: May 1, 2023               Content Version: 13.9  © 0934-1333 [employer].   Care instructions adapted under license by [employer]. If you have questions about a medical condition or this instruction, always ask your healthcare professional. [employer] disclaims any warranty or liability for your use of this information.           Learning About Dental Care for Older Adults  Dental care for older adults: Overview  Dental care for older people is much the same as for younger adults. But older adults do have concerns that younger adults do not. Older adults may have problems with gum disease and decay on the roots of their teeth. They may need missing teeth replaced or broken fillings fixed. Or they may have dentures that need to be cared for. Some older adults may have trouble holding a toothbrush.  You can help

## 2024-03-11 ENCOUNTER — NURSE ONLY (OUTPATIENT)
Dept: CARDIOLOGY CLINIC | Age: 89
End: 2024-03-11
Payer: MEDICARE

## 2024-03-11 ENCOUNTER — OFFICE VISIT (OUTPATIENT)
Dept: CARDIOLOGY CLINIC | Age: 89
End: 2024-03-11
Payer: MEDICARE

## 2024-03-11 VITALS
HEART RATE: 62 BPM | WEIGHT: 131.4 LBS | SYSTOLIC BLOOD PRESSURE: 110 MMHG | DIASTOLIC BLOOD PRESSURE: 70 MMHG | BODY MASS INDEX: 21.21 KG/M2

## 2024-03-11 DIAGNOSIS — I49.5 SSS (SICK SINUS SYNDROME) (HCC): ICD-10-CM

## 2024-03-11 DIAGNOSIS — Z95.0 PACEMAKER: ICD-10-CM

## 2024-03-11 DIAGNOSIS — Z95.0 PACEMAKER: Primary | ICD-10-CM

## 2024-03-11 DIAGNOSIS — I48.20 CHRONIC ATRIAL FIBRILLATION (HCC): Primary | ICD-10-CM

## 2024-03-11 DIAGNOSIS — I48.20 CHRONIC ATRIAL FIBRILLATION (HCC): Chronic | ICD-10-CM

## 2024-03-11 DIAGNOSIS — I10 ESSENTIAL HYPERTENSION: ICD-10-CM

## 2024-03-11 PROCEDURE — 93280 PM DEVICE PROGR EVAL DUAL: CPT | Performed by: INTERNAL MEDICINE

## 2024-03-11 PROCEDURE — 1090F PRES/ABSN URINE INCON ASSESS: CPT | Performed by: INTERNAL MEDICINE

## 2024-03-11 PROCEDURE — G8427 DOCREV CUR MEDS BY ELIG CLIN: HCPCS | Performed by: INTERNAL MEDICINE

## 2024-03-11 PROCEDURE — 93000 ELECTROCARDIOGRAM COMPLETE: CPT | Performed by: INTERNAL MEDICINE

## 2024-03-11 PROCEDURE — G8482 FLU IMMUNIZE ORDER/ADMIN: HCPCS | Performed by: INTERNAL MEDICINE

## 2024-03-11 PROCEDURE — 1036F TOBACCO NON-USER: CPT | Performed by: INTERNAL MEDICINE

## 2024-03-11 PROCEDURE — 99204 OFFICE O/P NEW MOD 45 MIN: CPT | Performed by: INTERNAL MEDICINE

## 2024-03-11 PROCEDURE — G8420 CALC BMI NORM PARAMETERS: HCPCS | Performed by: INTERNAL MEDICINE

## 2024-03-11 PROCEDURE — 1123F ACP DISCUSS/DSCN MKR DOCD: CPT | Performed by: INTERNAL MEDICINE

## 2024-03-11 NOTE — PROGRESS NOTES
New to [facility] EP/device.  Referred by Dr. Karimi.    Patient comes in for a programming evaluation on their MDT DC PPM implanted by Dr. Bansal 07.12.2023.  Hx: Afib, SSS.     Declines remote monitoring.  OV Cks only.  All sensing and pacing parameters appear stable since 08.25.2023.  12.2 yrs estimated until CONRADO/RRT. Underlying AsVs, SB, 40s.  AP 95.5%   9.5%.  PVC 0.2/hr.  Pt remains on metoprolol, xarelto, amiodarone, diltiazem.      PLEASE SEE MEDIA TAB OF CHART FOR FULL INTERROGATION REPORT.    Patient will see Dr. Arthur today and follow up in 3-6 months in office for a device check.

## 2024-03-13 DIAGNOSIS — G89.29 CHRONIC THORACIC BACK PAIN, UNSPECIFIED BACK PAIN LATERALITY: ICD-10-CM

## 2024-03-13 DIAGNOSIS — M54.6 CHRONIC THORACIC BACK PAIN, UNSPECIFIED BACK PAIN LATERALITY: ICD-10-CM

## 2024-03-13 RX ORDER — TRAMADOL HYDROCHLORIDE 50 MG/1
50 TABLET ORAL 2 TIMES DAILY PRN
Qty: 60 TABLET | Refills: 1 | Status: SHIPPED | OUTPATIENT
Start: 2024-03-13 | End: 2024-05-12

## 2024-03-13 NOTE — TELEPHONE ENCOUNTER
Medication:   Requested Prescriptions     Pending Prescriptions Disp Refills    traMADol (ULTRAM) 50 MG tablet 12 tablet 0     Sig: Take 1 tablet by mouth every 6 hours as needed for Pain for up to 30 days. Intended supply: 3 days. Take lowest dose possible to manage pain Max Daily Amount: 200 mg        Last Filled:  3/6/2024 #12 0 refill    Patient Phone Number: [phone] (home)     Last appt: 3/6/2024   Next appt: Visit date not found

## 2024-03-14 ENCOUNTER — CARE COORDINATION (OUTPATIENT)
Dept: CARE COORDINATION | Age: 89
End: 2024-03-14

## 2024-03-14 NOTE — CARE COORDINATION
ACM outreach made with no answer, 2nd attempt. Unable to leave VM as mailbox is full. Will attempt at a later date/time.

## 2024-03-21 ENCOUNTER — CARE COORDINATION (OUTPATIENT)
Dept: CARE COORDINATION | Age: 89
End: 2024-03-21

## 2024-03-21 NOTE — CARE COORDINATION
ACM outreach made with no answer, 3rd attempt. Unable to leave VM as mailbox is full. Will be available should patient call back. No further outreaches to be made.

## 2024-04-16 DIAGNOSIS — I48.20 CHRONIC ATRIAL FIBRILLATION (HCC): Chronic | ICD-10-CM

## 2024-04-16 RX ORDER — RIVAROXABAN 20 MG/1
20 TABLET, FILM COATED ORAL DAILY
Qty: 90 TABLET | Refills: 1 | OUTPATIENT
Start: 2024-04-16

## 2024-04-24 ENCOUNTER — TELEPHONE (OUTPATIENT)
Dept: PRIMARY CARE CLINIC | Age: 89
End: 2024-04-24

## 2024-04-24 NOTE — TELEPHONE ENCOUNTER
Patient called in to see if we could get her some type of discount on her medication Zyrelto, I told her that I would check it out for her.

## 2024-04-30 ENCOUNTER — OFFICE VISIT (OUTPATIENT)
Dept: PRIMARY CARE CLINIC | Age: 89
End: 2024-04-30
Payer: MEDICARE

## 2024-04-30 VITALS
HEIGHT: 66 IN | TEMPERATURE: 98 F | WEIGHT: 131.5 LBS | OXYGEN SATURATION: 96 % | BODY MASS INDEX: 21.13 KG/M2 | DIASTOLIC BLOOD PRESSURE: 78 MMHG | HEART RATE: 73 BPM | RESPIRATION RATE: 18 BRPM | SYSTOLIC BLOOD PRESSURE: 171 MMHG

## 2024-04-30 DIAGNOSIS — M54.6 ACUTE LEFT-SIDED THORACIC BACK PAIN: Primary | ICD-10-CM

## 2024-04-30 PROCEDURE — G8427 DOCREV CUR MEDS BY ELIG CLIN: HCPCS | Performed by: NURSE PRACTITIONER

## 2024-04-30 PROCEDURE — 1036F TOBACCO NON-USER: CPT | Performed by: NURSE PRACTITIONER

## 2024-04-30 PROCEDURE — 99213 OFFICE O/P EST LOW 20 MIN: CPT | Performed by: NURSE PRACTITIONER

## 2024-04-30 PROCEDURE — G8420 CALC BMI NORM PARAMETERS: HCPCS | Performed by: NURSE PRACTITIONER

## 2024-04-30 PROCEDURE — 1123F ACP DISCUSS/DSCN MKR DOCD: CPT | Performed by: NURSE PRACTITIONER

## 2024-04-30 PROCEDURE — 1090F PRES/ABSN URINE INCON ASSESS: CPT | Performed by: NURSE PRACTITIONER

## 2024-04-30 RX ORDER — BACLOFEN 10 MG/1
5 TABLET ORAL NIGHTLY PRN
Qty: 15 TABLET | Refills: 0 | Status: SHIPPED | OUTPATIENT
Start: 2024-04-30

## 2024-04-30 ASSESSMENT — ENCOUNTER SYMPTOMS
BACK PAIN: 1
EYES NEGATIVE: 1
RESPIRATORY NEGATIVE: 1
GASTROINTESTINAL NEGATIVE: 1

## 2024-04-30 NOTE — PROGRESS NOTES
Roxanne Best (:  10/31/1925) is a 89 y.o. female,Established patient, here for evaluation of the following chief complaint(s):  Pulled Muscle (Last happened in . Mid back pain: severe pain off and on: tramadol has helped ) and Shoulder Pain (R shoulder pain: rolled out of bed )      Assessment & Plan   1. Acute left-sided thoracic back pain  -     baclofen (LIORESAL) 10 MG tablet; Take 0.5 tablets by mouth nightly as needed (muscle spasms), Disp-15 tablet, R-0Normal      No follow-ups on file.       Subjective   HPI  Patient presents to office for back pain and shoulder pain.  She stated that she injured her back in  and underwent physical therapy which helped.  Stated that she rolled out of bed recently and injured her right arm and now she has pain in her right arm and left side of her back.    Review of Systems   Constitutional: Negative.    HENT: Negative.     Eyes: Negative.    Respiratory: Negative.     Cardiovascular: Negative.    Gastrointestinal: Negative.    Endocrine: Negative.    Genitourinary: Negative.    Musculoskeletal:  Positive for back pain and myalgias.   Skin: Negative.    Neurological: Negative.    Hematological: Negative.    Psychiatric/Behavioral: Negative.            Objective   Physical Exam  HENT:      Right Ear: Tympanic membrane and ear canal normal.      Left Ear: Tympanic membrane and ear canal normal.      Nose: Nose normal.      Mouth/Throat:      Mouth: Mucous membranes are moist.   Eyes:      Extraocular Movements: Extraocular movements intact.   Cardiovascular:      Rate and Rhythm: Normal rate.      Pulses: Normal pulses.      Heart sounds: Normal heart sounds.   Pulmonary:      Effort: Pulmonary effort is normal.      Breath sounds: Normal breath sounds.   Abdominal:      General: Bowel sounds are normal.      Palpations: Abdomen is soft.   Musculoskeletal:         General: Normal range of motion.      Cervical back: Normal range of motion.   Skin:     General:

## 2024-05-09 ENCOUNTER — TELEPHONE (OUTPATIENT)
Dept: PRIMARY CARE CLINIC | Age: 89
End: 2024-05-09

## 2024-05-09 NOTE — TELEPHONE ENCOUNTER
Pt was advised to call back if the muscle relaxer did not work for her back.  She was told the next step would be an xray    Please place an order for an xray and call the pt once placed so she can schedule

## 2024-05-13 DIAGNOSIS — M54.6 CHRONIC THORACIC BACK PAIN, UNSPECIFIED BACK PAIN LATERALITY: Primary | ICD-10-CM

## 2024-05-13 DIAGNOSIS — G89.29 CHRONIC THORACIC BACK PAIN, UNSPECIFIED BACK PAIN LATERALITY: Primary | ICD-10-CM

## 2024-05-14 ENCOUNTER — TELEPHONE (OUTPATIENT)
Dept: PRIMARY CARE CLINIC | Age: 89
End: 2024-05-14

## 2024-05-14 ENCOUNTER — TELEPHONE (OUTPATIENT)
Dept: CARDIOLOGY CLINIC | Age: 89
End: 2024-05-14

## 2024-05-14 NOTE — TELEPHONE ENCOUNTER
Roxanne, fell on Sunday and has a hematoma on her hip so the doctor suggested that she stops taking the xarelto ( the little red pill)and she wants to know what you think about her stopping that medication.

## 2024-05-14 NOTE — TELEPHONE ENCOUNTER
Hold Xarelto until seen by PCP or cards  Please have her see NP ASAP to discuss OAC vs no anticoag

## 2024-05-14 NOTE — TELEPHONE ENCOUNTER
Corby Rutledge, a brother-in-law called and stated she just got out of the hospital today.  She fell and there were no bones broken but  the blood collected on her right hip (hematoma).  He said the hospital doctor said she maybe on too much blood thinner and may need to come off for a while.  He stated she does have a pacemaker and therefore wants to know what should she do.  Patient wants a call back [phone]

## 2024-05-14 NOTE — TELEPHONE ENCOUNTER
Patient was instructed to hold xarelto for 3 days after leaving the hospital. Then needs to decide if she should go back on.

## 2024-05-15 NOTE — TELEPHONE ENCOUNTER
Spoke with patient, she has contacted her PCP. Advised her to make an appointment with PCP to discuss OAC and hospital follow up.

## 2024-05-15 NOTE — TELEPHONE ENCOUNTER
Pt went to ER on 5/12/24.     Emerson Mason MD - 05/14/2024 9:14 AM EDT   Formatting of this note might be different from the original.  Hold xarelto for 3 days after discharge. Follow up with your PCP or cardiologist and discuss the risks and benefits of this medication moving forward. You should also be taking a 15 mg dose if you choose to continue taking it.

## 2024-05-17 ENCOUNTER — HOSPITAL ENCOUNTER (OUTPATIENT)
Age: 89
Discharge: HOME | End: 2024-05-17
Payer: MEDICARE

## 2024-05-17 ENCOUNTER — HOSPITAL ENCOUNTER (OUTPATIENT)
Dept: GENERAL RADIOLOGY | Age: 89
Discharge: HOME | End: 2024-05-17
Payer: MEDICARE

## 2024-05-17 DIAGNOSIS — M54.6 CHRONIC THORACIC BACK PAIN, UNSPECIFIED BACK PAIN LATERALITY: ICD-10-CM

## 2024-05-17 DIAGNOSIS — G89.29 CHRONIC THORACIC BACK PAIN, UNSPECIFIED BACK PAIN LATERALITY: ICD-10-CM

## 2024-05-17 PROCEDURE — 72072 X-RAY EXAM THORAC SPINE 3VWS: CPT

## 2024-05-21 NOTE — TELEPHONE ENCOUNTER
Patient's brother in law has been informed.   He would like to know what the results show from her x-ray.

## 2024-05-23 ENCOUNTER — OFFICE VISIT (OUTPATIENT)
Dept: PRIMARY CARE CLINIC | Age: 89
End: 2024-05-23
Payer: MEDICARE

## 2024-05-23 VITALS
BODY MASS INDEX: 20.99 KG/M2 | SYSTOLIC BLOOD PRESSURE: 122 MMHG | TEMPERATURE: 98.5 F | HEART RATE: 69 BPM | WEIGHT: 130.6 LBS | DIASTOLIC BLOOD PRESSURE: 67 MMHG | HEIGHT: 66 IN | OXYGEN SATURATION: 98 % | RESPIRATION RATE: 16 BRPM

## 2024-05-23 DIAGNOSIS — Z09 HOSPITAL DISCHARGE FOLLOW-UP: Primary | ICD-10-CM

## 2024-05-23 DIAGNOSIS — M19.90 ARTHRITIS: Primary | ICD-10-CM

## 2024-05-23 PROCEDURE — G8427 DOCREV CUR MEDS BY ELIG CLIN: HCPCS | Performed by: NURSE PRACTITIONER

## 2024-05-23 PROCEDURE — 1090F PRES/ABSN URINE INCON ASSESS: CPT | Performed by: NURSE PRACTITIONER

## 2024-05-23 PROCEDURE — G8420 CALC BMI NORM PARAMETERS: HCPCS | Performed by: NURSE PRACTITIONER

## 2024-05-23 PROCEDURE — 99213 OFFICE O/P EST LOW 20 MIN: CPT | Performed by: NURSE PRACTITIONER

## 2024-05-23 PROCEDURE — 1111F DSCHRG MED/CURRENT MED MERGE: CPT | Performed by: NURSE PRACTITIONER

## 2024-05-23 PROCEDURE — 1036F TOBACCO NON-USER: CPT | Performed by: NURSE PRACTITIONER

## 2024-05-23 PROCEDURE — 1123F ACP DISCUSS/DSCN MKR DOCD: CPT | Performed by: NURSE PRACTITIONER

## 2024-05-23 NOTE — PROGRESS NOTES
Post-Discharge Transitional Care  Follow Up      Roxanne Best   YOB: 1925    Date of Office Visit:  5/23/2024  Date of Hospital Admission:    Date of Hospital Discharge:    Risk of hospital readmission (high >=14%. Medium >=10%) :No data recorded    Care management risk score Rising risk (score 2-5) and Complex Care (Scores >=6): No Risk Score On File     Non face to face  following discharge, date last encounter closed (first attempt may have been earlier): *No documented post hospital discharge outreach found in the last 14 days    Call initiated 2 business days of discharge: *No response recorded in the last 14 days    ASSESSMENT/PLAN:   Hospital discharge follow-up  -     PA DISCHARGE MEDS RECONCILED W/ CURRENT OUTPATIENT MED LIST      Medical Decision Making: moderate complexity  No follow-ups on file.    On this date 5/23/2024 I have spent 30 minutes reviewing previous notes, test results and face to face with the patient discussing the diagnosis and importance of compliance with the treatment plan as well as documenting on the day of the visit.       Subjective:   HPI:  Follow up of Hospital problems/diagnosis(es): patient is here for a hospital follow up for a fall. She was informed that her xarelto should be assessed for discontinuation. Discussed with patient and advised to take every other day. Advised to place ice on hematoma     Inpatient course: Discharge summary reviewed- see chart.    Interval history/Current status: stable     Patient Active Problem List   Diagnosis    Arthritis of left hip    Chronic atrial fibrillation (HCC)    Essential hypertension    Low back pain    Presence of left artificial hip joint    Pacemaker       Medications listed as ordered at the time of discharge from hospital     Medication List            Accurate as of May 23, 2024  9:37 AM. If you have any questions, ask your nurse or doctor.                START taking these medications      diclofenac

## 2024-06-10 ENCOUNTER — TELEPHONE (OUTPATIENT)
Dept: PSYCHOLOGY | Age: 89
End: 2024-06-10

## 2024-06-10 NOTE — TELEPHONE ENCOUNTER
Sparkle just wanted her provider to know that she is being discharged from home care today and is going great!    Just an FYI

## 2024-06-11 ENCOUNTER — NURSE ONLY (OUTPATIENT)
Dept: CARDIOLOGY CLINIC | Age: 89
End: 2024-06-11

## 2024-06-11 DIAGNOSIS — I48.20 CHRONIC ATRIAL FIBRILLATION (HCC): Chronic | ICD-10-CM

## 2024-06-11 DIAGNOSIS — I49.5 SSS (SICK SINUS SYNDROME) (HCC): ICD-10-CM

## 2024-06-11 DIAGNOSIS — Z95.0 PACEMAKER: Primary | ICD-10-CM

## 2024-06-11 NOTE — PROGRESS NOTES
Patient comes in for a 3 month programming evaluation on their MDT DC PPM implanted by Dr. Bansal 07.12.2023.  Hx: Afib, SSS.  Declines remote monitoring.  OV Cks only.  All sensing and pacing parameters appear stable since last OV ck on 03.11.2024.  12.2 yrs estimated until CONRADO/RRT. Underlying AsVs, SB, 40s.  AP 95.5%   9.5%.  PVC 0.2/hr.  Pt remains on metoprolol, xarelto, amiodarone, diltiazem.       PLEASE SEE MEDIA TAB OF CHART FOR FULL INTERROGATION REPORT.    Patient will follow up as scheduled on 09.09.2024 w/EPNP and device.

## 2024-08-13 ENCOUNTER — OFFICE VISIT (OUTPATIENT)
Dept: PRIMARY CARE CLINIC | Age: 89
End: 2024-08-13
Payer: MEDICARE

## 2024-08-13 VITALS
HEART RATE: 66 BPM | DIASTOLIC BLOOD PRESSURE: 74 MMHG | BODY MASS INDEX: 20.48 KG/M2 | HEIGHT: 66 IN | WEIGHT: 127.4 LBS | SYSTOLIC BLOOD PRESSURE: 162 MMHG | OXYGEN SATURATION: 96 %

## 2024-08-13 DIAGNOSIS — H04.301 INFECTION OF RIGHT TEAR DUCT: Primary | ICD-10-CM

## 2024-08-13 PROCEDURE — 99214 OFFICE O/P EST MOD 30 MIN: CPT | Performed by: FAMILY MEDICINE

## 2024-08-13 PROCEDURE — 1090F PRES/ABSN URINE INCON ASSESS: CPT | Performed by: FAMILY MEDICINE

## 2024-08-13 PROCEDURE — 1036F TOBACCO NON-USER: CPT | Performed by: FAMILY MEDICINE

## 2024-08-13 PROCEDURE — G8420 CALC BMI NORM PARAMETERS: HCPCS | Performed by: FAMILY MEDICINE

## 2024-08-13 PROCEDURE — 1123F ACP DISCUSS/DSCN MKR DOCD: CPT | Performed by: FAMILY MEDICINE

## 2024-08-13 PROCEDURE — G8427 DOCREV CUR MEDS BY ELIG CLIN: HCPCS | Performed by: FAMILY MEDICINE

## 2024-08-13 RX ORDER — AMOXICILLIN AND CLAVULANATE POTASSIUM 875; 125 MG/1; MG/1
TABLET, FILM COATED ORAL
COMMUNITY
Start: 2024-08-10

## 2024-08-13 ASSESSMENT — ENCOUNTER SYMPTOMS
SORE THROAT: 0
SHORTNESS OF BREATH: 0
RHINORRHEA: 0
DIARRHEA: 0
CHEST TIGHTNESS: 0
CONSTIPATION: 0
ABDOMINAL PAIN: 0

## 2024-08-13 NOTE — PROGRESS NOTES
Subjective:   Patient ID: Roxanne Best is a 89 y.o. female.  HPI by clinical support staff:   Chief Complaint   Patient presents with    Follow-up     Pt went to urgent care Sunday for her eye      Preliminary data above this line collected by clinical support staff.    ______________________________________________________________________  HPI by Provider:   HPI   Patient presents today with right eye swelling that started about 4 days ago.  Was seen in urgent care and started on Augmentin and was given a erythromycin topical cream.  Denies any pain or vision changes states that it does have significant discharge.  Occasionally gets injections in the eye.  Her ophthalmologist is in [place].  Left eye is prosthetic.  Has no systemic symptoms at this time including fever or muscle aches.   Data above this line collected by Provider.    Patient's medications, allergies, past medical, surgical, social and family histories were reviewed and updated as appropriate.  Patient Care Team:  Cheryl James MD as PCP - General (Family Medicine)  Cheryl James MD as PCP - Empaneled Provider  Anai Medley RN as Ambulatory Care Manager  Current Outpatient Medications on File Prior to Visit   Medication Sig Dispense Refill    amoxicillin-clavulanate (AUGMENTIN) 875-125 MG per tablet TAKE ONE TABLET BY MOUTH EVERY TWELVE HOURS      diclofenac sodium (VOLTAREN) 1 % GEL Apply 4 g topically 4 times daily 150 g 1    baclofen (LIORESAL) 10 MG tablet Take 0.5 tablets by mouth nightly as needed (muscle spasms) 15 tablet 0    metoprolol succinate (TOPROL XL) 25 MG extended release tablet Take 1 tablet by mouth daily 90 tablet 1    XARELTO 20 MG TABS tablet Take 1 tablet by mouth daily (Patient taking differently: Take 1 tablet by mouth every other day) 90 tablet 1    docusate sodium (COLACE) 100 MG capsule Take 1 capsule by mouth 2 times daily 60 capsule 2    amiodarone (CORDARONE) 200 MG tablet Take 1 tablet by mouth daily

## 2024-09-09 ENCOUNTER — OFFICE VISIT (OUTPATIENT)
Dept: CARDIOLOGY CLINIC | Age: 89
End: 2024-09-09
Payer: MEDICARE

## 2024-09-09 ENCOUNTER — NURSE ONLY (OUTPATIENT)
Dept: CARDIOLOGY CLINIC | Age: 89
End: 2024-09-09

## 2024-09-09 VITALS
SYSTOLIC BLOOD PRESSURE: 148 MMHG | DIASTOLIC BLOOD PRESSURE: 76 MMHG | BODY MASS INDEX: 21.43 KG/M2 | WEIGHT: 132.8 LBS | HEART RATE: 60 BPM

## 2024-09-09 DIAGNOSIS — I10 BENIGN ESSENTIAL HTN: ICD-10-CM

## 2024-09-09 DIAGNOSIS — I49.5 SSS (SICK SINUS SYNDROME) (HCC): ICD-10-CM

## 2024-09-09 DIAGNOSIS — Z95.0 PACEMAKER: ICD-10-CM

## 2024-09-09 DIAGNOSIS — Z79.899 ON AMIODARONE THERAPY: ICD-10-CM

## 2024-09-09 DIAGNOSIS — I48.0 PAROXYSMAL ATRIAL FIBRILLATION (HCC): ICD-10-CM

## 2024-09-09 DIAGNOSIS — I49.5 SSS (SICK SINUS SYNDROME) (HCC): Primary | ICD-10-CM

## 2024-09-09 DIAGNOSIS — Z95.0 PACEMAKER: Primary | ICD-10-CM

## 2024-09-09 DIAGNOSIS — D68.69 SECONDARY HYPERCOAGULABLE STATE (HCC): ICD-10-CM

## 2024-09-09 DIAGNOSIS — Z79.01 ON CONTINUOUS ORAL ANTICOAGULATION: ICD-10-CM

## 2024-09-09 DIAGNOSIS — I48.20 CHRONIC ATRIAL FIBRILLATION (HCC): Chronic | ICD-10-CM

## 2024-09-09 PROCEDURE — 1036F TOBACCO NON-USER: CPT | Performed by: NURSE PRACTITIONER

## 2024-09-09 PROCEDURE — 99214 OFFICE O/P EST MOD 30 MIN: CPT | Performed by: NURSE PRACTITIONER

## 2024-09-09 PROCEDURE — 1123F ACP DISCUSS/DSCN MKR DOCD: CPT | Performed by: NURSE PRACTITIONER

## 2024-09-09 PROCEDURE — G8420 CALC BMI NORM PARAMETERS: HCPCS | Performed by: NURSE PRACTITIONER

## 2024-09-09 PROCEDURE — 93000 ELECTROCARDIOGRAM COMPLETE: CPT | Performed by: NURSE PRACTITIONER

## 2024-09-09 PROCEDURE — 1090F PRES/ABSN URINE INCON ASSESS: CPT | Performed by: NURSE PRACTITIONER

## 2024-09-09 PROCEDURE — G8427 DOCREV CUR MEDS BY ELIG CLIN: HCPCS | Performed by: NURSE PRACTITIONER

## 2024-09-09 RX ORDER — ANTIOX #8/OM3/DHA/EPA/LUT/ZEAX 250-2.5 MG
2 CAPSULE ORAL DAILY
COMMUNITY
Start: 2024-09-04

## 2024-09-23 DIAGNOSIS — I48.20 CHRONIC ATRIAL FIBRILLATION (HCC): Chronic | ICD-10-CM

## 2024-09-23 RX ORDER — AMIODARONE HYDROCHLORIDE 200 MG/1
200 TABLET ORAL DAILY
Qty: 90 TABLET | Refills: 0 | Status: SHIPPED | OUTPATIENT
Start: 2024-09-23

## 2024-10-07 RX ORDER — DILTIAZEM HYDROCHLORIDE 120 MG/1
120 CAPSULE, COATED, EXTENDED RELEASE ORAL DAILY
Qty: 90 CAPSULE | Refills: 0 | OUTPATIENT
Start: 2024-10-07

## 2024-10-10 RX ORDER — DILTIAZEM HYDROCHLORIDE 120 MG/1
120 CAPSULE, EXTENDED RELEASE ORAL DAILY
Qty: 90 CAPSULE | Refills: 1 | Status: SHIPPED | OUTPATIENT
Start: 2024-10-10

## 2024-11-19 ENCOUNTER — TELEPHONE (OUTPATIENT)
Dept: PRIMARY CARE CLINIC | Age: 89
End: 2024-11-19

## 2024-11-19 NOTE — TELEPHONE ENCOUNTER
Riley from [facility] states the patient fell and broke her R arm.  Riley would like a verbal for PT, OT and Nursing  Riley would also like to know if the patient should have ongoing CBC and Renal blood draws.  The hospital was testing her and she would like to know if this should continue    Please call back with verbal and answer to lab draws

## 2024-11-20 NOTE — TELEPHONE ENCOUNTER
Verbal ok for PT, OT and Nursing .  How often is she checking blood- monthly is okay if her last results are what we have in care everywhere from October.  Thank you

## 2024-11-20 NOTE — TELEPHONE ENCOUNTER
Called to give verbal ok.  She states the hospital discharged with standing orders until end of December and is up to the PCP to decide.   She will do her blood work next week and again at the end of December.

## 2024-11-25 ENCOUNTER — OFFICE VISIT (OUTPATIENT)
Dept: PRIMARY CARE CLINIC | Age: 88
End: 2024-11-25
Payer: MEDICARE

## 2024-11-25 VITALS
OXYGEN SATURATION: 95 % | RESPIRATION RATE: 16 BRPM | WEIGHT: 135.5 LBS | SYSTOLIC BLOOD PRESSURE: 146 MMHG | DIASTOLIC BLOOD PRESSURE: 82 MMHG | HEIGHT: 66 IN | TEMPERATURE: 98.2 F | BODY MASS INDEX: 21.78 KG/M2 | HEART RATE: 79 BPM

## 2024-11-25 DIAGNOSIS — D68.69 SECONDARY HYPERCOAGULABLE STATE (HCC): Chronic | ICD-10-CM

## 2024-11-25 DIAGNOSIS — S42.331A CLOSED DISPLACED OBLIQUE FRACTURE OF SHAFT OF RIGHT HUMERUS, INITIAL ENCOUNTER: Primary | ICD-10-CM

## 2024-11-25 DIAGNOSIS — Z95.0 PACEMAKER: Chronic | ICD-10-CM

## 2024-11-25 PROBLEM — M16.12 ARTHRITIS OF LEFT HIP: Status: ACTIVE | Noted: 2018-09-24

## 2024-11-25 PROBLEM — Z96.642 PRESENCE OF LEFT ARTIFICIAL HIP JOINT: Chronic | Status: ACTIVE | Noted: 2018-09-27

## 2024-11-25 PROBLEM — M54.50 LOW BACK PAIN: Status: RESOLVED | Noted: 2020-07-06 | Resolved: 2024-11-25

## 2024-11-25 PROBLEM — M16.12 ARTHRITIS OF LEFT HIP: Status: RESOLVED | Noted: 2018-09-24 | Resolved: 2024-11-25

## 2024-11-25 PROCEDURE — 1159F MED LIST DOCD IN RCRD: CPT | Performed by: FAMILY MEDICINE

## 2024-11-25 PROCEDURE — 1036F TOBACCO NON-USER: CPT | Performed by: FAMILY MEDICINE

## 2024-11-25 PROCEDURE — 1160F RVW MEDS BY RX/DR IN RCRD: CPT | Performed by: FAMILY MEDICINE

## 2024-11-25 PROCEDURE — 99214 OFFICE O/P EST MOD 30 MIN: CPT | Performed by: FAMILY MEDICINE

## 2024-11-25 PROCEDURE — G8427 DOCREV CUR MEDS BY ELIG CLIN: HCPCS | Performed by: FAMILY MEDICINE

## 2024-11-25 PROCEDURE — 1090F PRES/ABSN URINE INCON ASSESS: CPT | Performed by: FAMILY MEDICINE

## 2024-11-25 PROCEDURE — 1123F ACP DISCUSS/DSCN MKR DOCD: CPT | Performed by: FAMILY MEDICINE

## 2024-11-25 PROCEDURE — G8484 FLU IMMUNIZE NO ADMIN: HCPCS | Performed by: FAMILY MEDICINE

## 2024-11-25 PROCEDURE — G8420 CALC BMI NORM PARAMETERS: HCPCS | Performed by: FAMILY MEDICINE

## 2024-11-25 ASSESSMENT — ENCOUNTER SYMPTOMS
CHEST TIGHTNESS: 0
SHORTNESS OF BREATH: 0
CONSTIPATION: 0
SORE THROAT: 0
ABDOMINAL PAIN: 0
DIARRHEA: 0
RHINORRHEA: 0

## 2024-11-25 NOTE — PROGRESS NOTES
of right humerus, initial encounter  Healing well. Continue physical therapy      2. Pacemaker  Chronic stable      3. Secondary hypercoagulable state (HCC)  Chronic stable             This chart note was prepared using a voice recognition dictation program. This note was reviewed for accuracy; however, addition, deletion and sound-alike word errors may occur. If there are any questions regarding this chart note, please contact the originating provider.    Electronically signed by   Cheryl James MD  11/25/2024   1:58 PM    No follow-ups on file.

## 2024-12-03 ENCOUNTER — TELEPHONE (OUTPATIENT)
Dept: PRIMARY CARE CLINIC | Age: 88
End: 2024-12-03

## 2024-12-03 NOTE — TELEPHONE ENCOUNTER
Paige called with the patient on the line and requested a med list be mailed to the address on file    Mailed

## 2024-12-11 ENCOUNTER — TELEPHONE (OUTPATIENT)
Dept: PRIMARY CARE CLINIC | Age: 88
End: 2024-12-11

## 2024-12-11 NOTE — TELEPHONE ENCOUNTER
Patient called to inform you of the Thyroid medication that she was given while in the nursing home which was Levothyroxine by Dr. Connie Bundy, taking  25 mcg and 50 mcg and after the  14th  moving to the 50 gr .

## 2025-01-02 ENCOUNTER — TELEPHONE (OUTPATIENT)
Dept: PRIMARY CARE CLINIC | Age: 89
End: 2025-01-02

## 2025-01-02 DIAGNOSIS — I48.20 CHRONIC ATRIAL FIBRILLATION (HCC): Chronic | ICD-10-CM

## 2025-01-02 RX ORDER — RIVAROXABAN 15 MG/1
TABLET, FILM COATED ORAL
Qty: 15 TABLET | Refills: 0 | OUTPATIENT
Start: 2025-01-02

## 2025-01-02 RX ORDER — LEVOTHYROXINE SODIUM 25 UG/1
TABLET ORAL
Qty: 42 TABLET | Refills: 0 | OUTPATIENT
Start: 2025-01-02

## 2025-01-02 RX ORDER — RIVAROXABAN 20 MG/1
20 TABLET, FILM COATED ORAL DAILY
Qty: 90 TABLET | Refills: 1 | Status: SHIPPED | OUTPATIENT
Start: 2025-01-02

## 2025-01-02 RX ORDER — LEVOTHYROXINE SODIUM 25 UG/1
25 TABLET ORAL DAILY
Qty: 90 TABLET | Refills: 1 | Status: SHIPPED | OUTPATIENT
Start: 2025-01-02

## 2025-01-02 NOTE — TELEPHONE ENCOUNTER
Rx is needed for the Levothyroxine 25MCG tab from the provider at the facility where she went for rehabilitation along with her Xarelto 20 MG tab.

## 2025-01-02 NOTE — TELEPHONE ENCOUNTER
Medication:   Requested Prescriptions     Pending Prescriptions Disp Refills    levothyroxine (SYNTHROID) 25 MCG tablet [Pharmacy Med Name: levothyroxine 25 mcg tablet] 42 tablet 0     Sig: TAKE ONE Tablet BY MOUTH ONCE DAILY with 50mcg TABLET (total daily DOSE of 75mcg) for 6 weeks THEN discontinue 25mcg DOSE    XARELTO 15 MG TABS tablet [Pharmacy Med Name: Xarelto 15 mg tablet] 15 tablet 0     Sig: TAKE ONE Tablet BY MOUTH EVERY OTHER DAY at bedtime        Last Filled:      Patient Phone Number: [phone] (home)     Last appt: 11/25/2024   Next appt: 1/2/2025    Last OARRS:        No data to display

## 2025-01-14 ENCOUNTER — TELEPHONE (OUTPATIENT)
Dept: PRIMARY CARE CLINIC | Age: 89
End: 2025-01-14

## 2025-01-14 NOTE — TELEPHONE ENCOUNTER
There seems to be some concerns for the dosages for both the patients thyroid medicine and the xarelto. What are the correct amounts that she should be taking.

## 2025-01-14 NOTE — TELEPHONE ENCOUNTER
Dispensed Days Supply Quantity Provider Pharmacy    Xarelto 15 mg tablet 11/15/2024 30 15 each Connie Bundy MD [facility]...   XARELTO 20 MG TABLET 05/19/2024 90 90 each BRENDEN CLARK [facility] Pharmac...   XARELTO 20 MG TABLET 05/13/2024 90 90 each BRENDEN CLARK [facility] Pharmac...   XARELTO 20 MG TABLET 12/19/2023 90 90 each AMBER,BRENDEN [facility] Discharge Pharmac...         Dispensed Days Supply Quantity Provider Pharmacy    levothyroxine 25 mcg tablet 11/15/2024 42 42 each Connie Bundy MD [facility]...   levothyroxine 50 mcg tablet 11/15/2024 30 30 each Connie Bundy MD [facility]...

## 2025-01-14 NOTE — TELEPHONE ENCOUNTER
Xarelto 15mg- reduced due to her risk of fall at her last hospital admission.     Levothyroxine 50mcg daily.  25mcg was added ( 75mcg total ) for 6 weeks to boost her numbers.

## 2025-01-23 NOTE — PROGRESS NOTES
[facility]     Outpatient Cardiology         Patient Name:  Roxanne Best  Requesting Physician: No admitting provider for patient encounter.  Primary Care Physician: Cheryl James MD    Reason for Consultation/Chief Complaint:   Chief Complaint   Patient presents with    Edema     BLLE    Atrial Fibrillation         History of Present Illness:    HPI     Roxanne Garduno a 89 y.o. female with PMH of Afib, HTN, depression, arthritis, frequent falls,  sss-s/p dual chamber PPM (7/2023, Dr. Bansal).   Here for follow up for afib and HTN.   Afib, rate controlled, on amiodarone, metoprolol, diltiazem.  On Xarelto.  No bleeding.  Sick sinus syndrome, s/p PPM, following with EP , working properly  HTN, controlled on current dose of diltiazem, no side effects  Doing well from a cardiac perspective.      PMH  Past Medical History:   Diagnosis Date    A-fib (HCC)     Allergic rhinitis     Arthritis of left hip 09/24/2018    Depression     Hypertension        PSH  Past Surgical History:   Procedure Laterality Date    JOINT REPLACEMENT  2019        Social HIstory  Social History     Tobacco Use    Smoking status: Never    Smokeless tobacco: Never   Vaping Use    Vaping status: Never Used    Passive vaping exposure: Yes   Substance Use Topics    Alcohol use: Not Currently    Drug use: Never       Family History  Family History   Problem Relation Age of Onset    No Known Problems Mother     No Known Problems Father        Allergies   No Known Allergies    Medications:     Home Medications:  Were reviewed and are listed in nursing record. and/or listed below    Prior to Admission medications    Medication Sig Start Date End Date Taking? Authorizing Provider   XARELTO 20 MG TABS tablet Take 1 tablet by mouth daily 1/2/25  Yes Cheryl James MD   levothyroxine (SYNTHROID) 25 MCG tablet Take 1 tablet by mouth daily 1/2/25  Yes Cheryl James MD   dilTIAZem (DILACOR XR) 120 MG extended release

## 2025-01-28 ENCOUNTER — OFFICE VISIT (OUTPATIENT)
Dept: CARDIOLOGY CLINIC | Age: 89
End: 2025-01-28
Payer: MEDICARE

## 2025-01-28 VITALS
BODY MASS INDEX: 22.31 KG/M2 | OXYGEN SATURATION: 98 % | HEART RATE: 78 BPM | SYSTOLIC BLOOD PRESSURE: 120 MMHG | WEIGHT: 138.2 LBS | DIASTOLIC BLOOD PRESSURE: 80 MMHG

## 2025-01-28 DIAGNOSIS — Z95.0 PACEMAKER: Chronic | ICD-10-CM

## 2025-01-28 DIAGNOSIS — I48.20 CHRONIC ATRIAL FIBRILLATION (HCC): Primary | Chronic | ICD-10-CM

## 2025-01-28 DIAGNOSIS — I10 ESSENTIAL HYPERTENSION: Chronic | ICD-10-CM

## 2025-01-28 PROCEDURE — 1036F TOBACCO NON-USER: CPT | Performed by: INTERNAL MEDICINE

## 2025-01-28 PROCEDURE — 1159F MED LIST DOCD IN RCRD: CPT | Performed by: INTERNAL MEDICINE

## 2025-01-28 PROCEDURE — G8420 CALC BMI NORM PARAMETERS: HCPCS | Performed by: INTERNAL MEDICINE

## 2025-01-28 PROCEDURE — G8427 DOCREV CUR MEDS BY ELIG CLIN: HCPCS | Performed by: INTERNAL MEDICINE

## 2025-01-28 PROCEDURE — 1123F ACP DISCUSS/DSCN MKR DOCD: CPT | Performed by: INTERNAL MEDICINE

## 2025-01-28 PROCEDURE — 1090F PRES/ABSN URINE INCON ASSESS: CPT | Performed by: INTERNAL MEDICINE

## 2025-01-28 PROCEDURE — 99214 OFFICE O/P EST MOD 30 MIN: CPT | Performed by: INTERNAL MEDICINE

## 2025-02-15 DIAGNOSIS — I48.20 CHRONIC ATRIAL FIBRILLATION (HCC): Chronic | ICD-10-CM

## 2025-02-15 DIAGNOSIS — I10 ESSENTIAL HYPERTENSION: Chronic | ICD-10-CM

## 2025-02-16 RX ORDER — METOPROLOL SUCCINATE 25 MG/1
TABLET, EXTENDED RELEASE ORAL
Qty: 90 TABLET | Refills: 0 | Status: SHIPPED | OUTPATIENT
Start: 2025-02-16

## 2025-02-16 RX ORDER — AMIODARONE HYDROCHLORIDE 200 MG/1
200 TABLET ORAL DAILY
Qty: 90 TABLET | Refills: 0 | Status: SHIPPED | OUTPATIENT
Start: 2025-02-16

## 2025-02-25 ENCOUNTER — OFFICE VISIT (OUTPATIENT)
Dept: PRIMARY CARE CLINIC | Age: 89
End: 2025-02-25
Payer: MEDICARE

## 2025-02-25 VITALS
OXYGEN SATURATION: 96 % | WEIGHT: 133.6 LBS | DIASTOLIC BLOOD PRESSURE: 87 MMHG | TEMPERATURE: 98 F | HEIGHT: 66 IN | HEART RATE: 65 BPM | BODY MASS INDEX: 21.47 KG/M2 | SYSTOLIC BLOOD PRESSURE: 168 MMHG

## 2025-02-25 DIAGNOSIS — R10.9 FLANK PAIN: Primary | ICD-10-CM

## 2025-02-25 DIAGNOSIS — R79.89 ABNORMAL TSH: ICD-10-CM

## 2025-02-25 DIAGNOSIS — I10 ESSENTIAL HYPERTENSION: ICD-10-CM

## 2025-02-25 PROBLEM — H35.3210 EXUDATIVE AGE-RELATED MACULAR DEGENERATION OF RIGHT EYE (HCC): Status: ACTIVE | Noted: 2018-05-25

## 2025-02-25 PROCEDURE — 1036F TOBACCO NON-USER: CPT | Performed by: FAMILY MEDICINE

## 2025-02-25 PROCEDURE — G8427 DOCREV CUR MEDS BY ELIG CLIN: HCPCS | Performed by: FAMILY MEDICINE

## 2025-02-25 PROCEDURE — 1123F ACP DISCUSS/DSCN MKR DOCD: CPT | Performed by: FAMILY MEDICINE

## 2025-02-25 PROCEDURE — 99214 OFFICE O/P EST MOD 30 MIN: CPT | Performed by: FAMILY MEDICINE

## 2025-02-25 PROCEDURE — 1090F PRES/ABSN URINE INCON ASSESS: CPT | Performed by: FAMILY MEDICINE

## 2025-02-25 PROCEDURE — 1160F RVW MEDS BY RX/DR IN RCRD: CPT | Performed by: FAMILY MEDICINE

## 2025-02-25 PROCEDURE — G8420 CALC BMI NORM PARAMETERS: HCPCS | Performed by: FAMILY MEDICINE

## 2025-02-25 PROCEDURE — 1159F MED LIST DOCD IN RCRD: CPT | Performed by: FAMILY MEDICINE

## 2025-02-25 SDOH — ECONOMIC STABILITY: FOOD INSECURITY: WITHIN THE PAST 12 MONTHS, THE FOOD YOU BOUGHT JUST DIDN'T LAST AND YOU DIDN'T HAVE MONEY TO GET MORE.: NEVER TRUE

## 2025-02-25 SDOH — ECONOMIC STABILITY: FOOD INSECURITY: WITHIN THE PAST 12 MONTHS, YOU WORRIED THAT YOUR FOOD WOULD RUN OUT BEFORE YOU GOT MONEY TO BUY MORE.: NEVER TRUE

## 2025-02-25 ASSESSMENT — PATIENT HEALTH QUESTIONNAIRE - PHQ9
2. FEELING DOWN, DEPRESSED OR HOPELESS: NOT AT ALL
SUM OF ALL RESPONSES TO PHQ QUESTIONS 1-9: 0

## 2025-02-25 ASSESSMENT — ENCOUNTER SYMPTOMS
SHORTNESS OF BREATH: 0
RHINORRHEA: 0
DIARRHEA: 0
SORE THROAT: 0
CHEST TIGHTNESS: 0
ABDOMINAL PAIN: 0
CONSTIPATION: 0

## 2025-02-25 NOTE — PROGRESS NOTES
Subjective:   Patient ID: Roxanne Best is a 89 y.o. female.  HPI by clinical support staff:   Chief Complaint   Patient presents with    Back Pain     Pain in middle back, but has not had pain since made appointment , patient states she used those pads on it and asprin      Preliminary data above this line collected by clinical support staff.    ______________________________________________________________________  HPI by Provider:   HPI   Patient presents today with complaint of pain on her right flank which started yesterday.  States that she has been sleeping in a recliner chair since she got her hair done but woke up today without the pain.  Has been using heating pads and also aspirin which has given her relief.  Denies any urinary symptoms at this time.  Overall states she is doing well no new concerns.   Data above this line collected by Provider.    Patient's medications, allergies, past medical, surgical, social and family histories were reviewed and updated as appropriate.  Patient Care Team:  Cheryl James MD as PCP - General (Family Medicine)  [facility]  Current Outpatient Medications on File Prior to Visit   Medication Sig Dispense Refill    metoprolol succinate (TOPROL XL) 25 MG extended release tablet TAKE ONE Tablet BY MOUTH ONCE DAILY (hold FOR FOR BLOOD PRESSURE less THAN 100/60 OR Heart rate less THAN 60) 90 tablet 0    amiodarone (CORDARONE) 200 MG tablet TAKE ONE Tablet BY MOUTH ONCE DAILY 90 tablet 0    XARELTO 20 MG TABS tablet Take 1 tablet by mouth daily 90 tablet 1    levothyroxine (SYNTHROID) 25 MCG tablet Take 1 tablet by mouth daily 90 tablet 1    dilTIAZem (DILACOR XR) 120 MG extended release capsule Take 1 capsule by mouth daily 90 capsule 1    Multiple Vitamins-Minerals (PRESERVISION AREDS 2) CAPS Take 2 capsules by mouth daily      docusate sodium (COLACE) 100 MG capsule Take 1 capsule by mouth 2 times daily 60 capsule 2     No current

## 2025-02-26 LAB
T4 FREE SERPL-MCNC: 0.7 NG/DL (ref 0.9–1.8)
TSH SERPL DL<=0.005 MIU/L-ACNC: 16.2 UIU/ML (ref 0.27–4.2)

## 2025-02-27 LAB
BACTERIA URNS QL MICRO: ABNORMAL /HPF
BILIRUB UR QL STRIP.AUTO: NEGATIVE
CLARITY UR: CLEAR
COLOR UR: YELLOW
EPI CELLS #/AREA URNS AUTO: 0 /HPF (ref 0–5)
GLUCOSE UR STRIP.AUTO-MCNC: NEGATIVE MG/DL
HGB UR QL STRIP.AUTO: NEGATIVE
HYALINE CASTS #/AREA URNS AUTO: 0 /LPF (ref 0–8)
KETONES UR STRIP.AUTO-MCNC: NEGATIVE MG/DL
LEUKOCYTE ESTERASE UR QL STRIP.AUTO: NEGATIVE
NITRITE UR QL STRIP.AUTO: NEGATIVE
PH UR STRIP.AUTO: 8 [PH] (ref 5–8)
PROT UR STRIP.AUTO-MCNC: 30 MG/DL
RBC CLUMPS #/AREA URNS AUTO: 9 /HPF (ref 0–4)
SP GR UR STRIP.AUTO: 1.01 (ref 1–1.03)
UA DIPSTICK W REFLEX MICRO PNL UR: YES
URN SPEC COLLECT METH UR: ABNORMAL
UROBILINOGEN UR STRIP-ACNC: 0.2 E.U./DL
WBC #/AREA URNS AUTO: 0 /HPF (ref 0–5)

## 2025-03-04 ENCOUNTER — TELEPHONE (OUTPATIENT)
Dept: PRIMARY CARE CLINIC | Age: 89
End: 2025-03-04

## 2025-03-04 RX ORDER — LEVOTHYROXINE SODIUM 50 UG/1
50 TABLET ORAL
Qty: 90 TABLET | Refills: 0 | Status: SHIPPED | OUTPATIENT
Start: 2025-03-04

## 2025-03-04 NOTE — TELEPHONE ENCOUNTER
Patient called back to get her results which were given to her as well as wanting to know how her kidney results were. She never stopped taking her Thyroid medication and is still taking.

## 2025-03-18 ENCOUNTER — TELEPHONE (OUTPATIENT)
Dept: PRIMARY CARE CLINIC | Age: 89
End: 2025-03-18

## 2025-03-18 DIAGNOSIS — G89.29 CHRONIC THORACIC BACK PAIN, UNSPECIFIED BACK PAIN LATERALITY: Primary | ICD-10-CM

## 2025-03-18 DIAGNOSIS — M54.6 CHRONIC THORACIC BACK PAIN, UNSPECIFIED BACK PAIN LATERALITY: Primary | ICD-10-CM

## 2025-03-18 RX ORDER — TRAMADOL HYDROCHLORIDE 50 MG/1
50 TABLET ORAL 2 TIMES DAILY PRN
Qty: 60 TABLET | Refills: 0 | Status: SHIPPED | OUTPATIENT
Start: 2025-03-18 | End: 2025-04-17

## 2025-03-18 NOTE — TELEPHONE ENCOUNTER
Medication and Quantity requested:   traMADol (ULTRAM) 50 MG tablet      Last Visit  2/25/25    Pharmacy and phone number updated in EPIC:  yes  Na

## 2025-03-20 ENCOUNTER — CLINICAL SUPPORT (OUTPATIENT)
Dept: CARDIOLOGY CLINIC | Age: 89
End: 2025-03-20

## 2025-03-20 ENCOUNTER — OFFICE VISIT (OUTPATIENT)
Dept: CARDIOLOGY CLINIC | Age: 89
End: 2025-03-20

## 2025-03-20 VITALS
DIASTOLIC BLOOD PRESSURE: 90 MMHG | SYSTOLIC BLOOD PRESSURE: 150 MMHG | BODY MASS INDEX: 21.6 KG/M2 | WEIGHT: 133.8 LBS | HEART RATE: 64 BPM

## 2025-03-20 DIAGNOSIS — Z79.899 ON AMIODARONE THERAPY: ICD-10-CM

## 2025-03-20 DIAGNOSIS — Z95.0 PACEMAKER: ICD-10-CM

## 2025-03-20 DIAGNOSIS — I49.5 SSS (SICK SINUS SYNDROME) (HCC): Primary | ICD-10-CM

## 2025-03-20 DIAGNOSIS — I48.20 CHRONIC ATRIAL FIBRILLATION (HCC): Chronic | ICD-10-CM

## 2025-03-20 DIAGNOSIS — I48.0 PAROXYSMAL ATRIAL FIBRILLATION (HCC): ICD-10-CM

## 2025-03-20 DIAGNOSIS — I10 BENIGN ESSENTIAL HTN: ICD-10-CM

## 2025-03-20 DIAGNOSIS — Z79.01 ON CONTINUOUS ORAL ANTICOAGULATION: ICD-10-CM

## 2025-03-20 RX ORDER — AMIODARONE HYDROCHLORIDE 100 MG/1
100 TABLET ORAL DAILY
Qty: 90 TABLET | Refills: 3 | Status: SHIPPED | OUTPATIENT
Start: 2025-03-20

## 2025-03-20 RX ORDER — AMIODARONE HYDROCHLORIDE 200 MG/1
100 TABLET ORAL DAILY
Qty: 45 TABLET | Refills: 3 | Status: SHIPPED | OUTPATIENT
Start: 2025-03-20 | End: 2025-03-20

## 2025-03-20 NOTE — PATIENT INSTRUCTIONS
Decrease Xarelto to 15 mg every day    2.  Decrease amiodarone to 100 mg every day (cut your current pill in half until you run out). The next time you fill this Rx, the tablets will be 100 mg, so then take 1 whole tablet.

## 2025-05-12 RX ORDER — DILTIAZEM HYDROCHLORIDE 120 MG/1
120 CAPSULE, EXTENDED RELEASE ORAL DAILY
Qty: 90 CAPSULE | Refills: 1 | Status: SHIPPED | OUTPATIENT
Start: 2025-05-12

## 2025-05-12 NOTE — TELEPHONE ENCOUNTER
Medication:   Requested Prescriptions     Pending Prescriptions Disp Refills    dilTIAZem (TIAZAC) 120 MG extended release capsule [Pharmacy Med Name: diltiazem  mg capsule,24 hr,extended release] 90 capsule 1     Sig: TAKE ONE Capsule BY MOUTH DAILY        Last Filled:  10/10/24 #90 1 refill    Patient Phone Number: [phone] (home)     Last appt: 2/25/2025   Next appt: 5/27/2025    Last OARRS:        No data to display

## 2025-05-19 DIAGNOSIS — I48.20 CHRONIC ATRIAL FIBRILLATION (HCC): Chronic | ICD-10-CM

## 2025-05-19 DIAGNOSIS — I10 ESSENTIAL HYPERTENSION: Chronic | ICD-10-CM

## 2025-05-19 RX ORDER — METOPROLOL SUCCINATE 25 MG/1
TABLET, EXTENDED RELEASE ORAL
Qty: 90 TABLET | Refills: 0 | Status: SHIPPED | OUTPATIENT
Start: 2025-05-19

## 2025-05-19 NOTE — TELEPHONE ENCOUNTER
Medication:   Requested Prescriptions     Pending Prescriptions Disp Refills    metoprolol succinate (TOPROL XL) 25 MG extended release tablet [Pharmacy Med Name: metoprolol succinate ER 25 mg tablet,extended release 24 hr] 90 tablet 0     Sig: TAKE ONE Tablet BY MOUTH ONCE DAILY (hold FOR BLOOD PRESSURE less THAN 100/60 OR Heart rate less THAN 60)        Last Filled:  2/16/25 #90 0 refill    Patient Phone Number: [phone] (home)     Last appt: 2/25/2025   Next appt: 5/27/2025    Last OARRS:        No data to display

## 2025-05-27 ENCOUNTER — OFFICE VISIT (OUTPATIENT)
Dept: PRIMARY CARE CLINIC | Age: 89
End: 2025-05-27
Payer: MEDICARE

## 2025-05-27 VITALS
OXYGEN SATURATION: 97 % | HEIGHT: 66 IN | BODY MASS INDEX: 21.07 KG/M2 | DIASTOLIC BLOOD PRESSURE: 81 MMHG | SYSTOLIC BLOOD PRESSURE: 162 MMHG | TEMPERATURE: 98.1 F | HEART RATE: 65 BPM | RESPIRATION RATE: 16 BRPM | WEIGHT: 131.1 LBS

## 2025-05-27 DIAGNOSIS — I10 ESSENTIAL HYPERTENSION: Chronic | ICD-10-CM

## 2025-05-27 DIAGNOSIS — I48.20 CHRONIC ATRIAL FIBRILLATION (HCC): Chronic | ICD-10-CM

## 2025-05-27 DIAGNOSIS — L85.3 DRY SKIN DERMATITIS: ICD-10-CM

## 2025-05-27 DIAGNOSIS — Z00.00 MEDICARE ANNUAL WELLNESS VISIT, SUBSEQUENT: Primary | ICD-10-CM

## 2025-05-27 PROCEDURE — 1160F RVW MEDS BY RX/DR IN RCRD: CPT | Performed by: FAMILY MEDICINE

## 2025-05-27 PROCEDURE — G0439 PPPS, SUBSEQ VISIT: HCPCS | Performed by: FAMILY MEDICINE

## 2025-05-27 PROCEDURE — 1123F ACP DISCUSS/DSCN MKR DOCD: CPT | Performed by: FAMILY MEDICINE

## 2025-05-27 PROCEDURE — 1159F MED LIST DOCD IN RCRD: CPT | Performed by: FAMILY MEDICINE

## 2025-05-27 ASSESSMENT — PATIENT HEALTH QUESTIONNAIRE - PHQ9
1. LITTLE INTEREST OR PLEASURE IN DOING THINGS: SEVERAL DAYS
2. FEELING DOWN, DEPRESSED OR HOPELESS: SEVERAL DAYS
SUM OF ALL RESPONSES TO PHQ QUESTIONS 1-9: 2

## 2025-05-27 NOTE — PATIENT INSTRUCTIONS
are caring for to brush and floss their teeth or to clean their dentures. In some cases, you may need to do the brushing and other dental care tasks. People who have trouble using their hands or who have dementia may need this extra help.  How can you help with dental care?  Normal dental care  To keep the teeth and gums healthy:  Brush the teeth with fluoride toothpaste twice a day--in the morning and at night--and floss at least once a day. Plaque can quickly build up on the teeth of older adults.  Watch for the signs of gum disease. These signs include gums that bleed after brushing or after eating hard foods, such as apples.  See a dentist regularly. Many experts recommend checkups every 6 months.  Keep the dentist up to date on any new medications the person is taking.  Encourage a balanced diet that includes whole grains, vegetables, and fruits, and that is low in saturated fat and sodium.  Encourage the person you're caring for not to use tobacco products. They can affect dental and general health.  Many older adults have a fixed income and feel that they can't afford dental care. But most [facility] and [facility] have programs in which dentists help older adults by lowering fees. Contact your area's public health offices or  for information about dental care in your area.  Using a toothbrush  Older adults with arthritis sometimes have trouble brushing their teeth because they can't easily hold the toothbrush. Their hands and fingers may be stiff, painful, or weak. If this is the case, you can:  Offer an electric toothbrush.  Enlarge the handle of a non-electric toothbrush by wrapping a sponge, an elastic bandage, or adhesive tape around it.  Push the toothbrush handle through a ball made of rubber or soft foam.  Make the handle longer and thicker by taping Popsicle sticks or tongue depressors to it.  You may also be able to buy special toothbrushes, toothpaste dispensers, and floss holders.  Your

## 2025-05-27 NOTE — PROGRESS NOTES
Medicare Annual Wellness Visit    Roxanne Best is here for Medicare AWV (Patient is present today for AWV)    Assessment & Plan   Medicare annual wellness visit, subsequent     No follow-ups on file.     Subjective   The following acute and/or chronic problems were also addressed today:  Takes tramadol once daily and Aleve at night for her lower back pain.  Has a skin tag on her chest she would like removed wants to know if it is okay to cut it off at home.  States she does not want too much intervention at this age does not want to get her labs done at this time and does not want to be medicated for her thyroid.  Reports dry skin but drinks less than 4 ounces of fluids a day.    Patient's complete Health Risk Assessment and screening values have been reviewed and are found in Flowsheets. The following problems were reviewed today and where indicated follow up appointments were made and/or referrals ordered.    Positive Risk Factor Screenings with Interventions:    Fall Risk:  Do you feel unsteady or are you worried about falling? : (!) yes  2 or more falls in past year?: no  Fall with injury in past year?: (!) yes     Interventions:    Reviewed medications, home hazards, visual acuity, and co-morbidities that can increase risk for falls    Cognitive:   Clock Drawing Test (CDT): (!) Abnormal  Words recalled: 2 Words Recalled  Total Score: (!) 2  Total Score Interpretation: Abnormal Mini-Cog  Interventions:  Patient declines any further evaluation or treatment               Dentist Screen:  Have you seen the dentist within the past year?: (!) No    Intervention:  Advised to schedule with their dentist       ADL's:   Patient reports needing help with:  Select all that apply: (!) Laundry, Shopping, Food Preparation, Transportation, Taking Medications  Interventions:  Patient declined any further interventions or treatment              Objective   Vitals:    05/27/25 0936   BP: (!) 162/81   Pulse: 65   Resp: 16   Temp:

## 2025-06-14 DIAGNOSIS — I48.20 CHRONIC ATRIAL FIBRILLATION (HCC): Chronic | ICD-10-CM

## 2025-06-14 DIAGNOSIS — I10 ESSENTIAL HYPERTENSION: Chronic | ICD-10-CM

## 2025-06-15 RX ORDER — LEVOTHYROXINE SODIUM 50 UG/1
50 TABLET ORAL
Qty: 90 TABLET | Refills: 0 | OUTPATIENT
Start: 2025-06-15

## 2025-06-15 RX ORDER — METOPROLOL SUCCINATE 25 MG/1
TABLET, EXTENDED RELEASE ORAL
Qty: 90 TABLET | Refills: 2 | Status: SHIPPED | OUTPATIENT
Start: 2025-06-15

## 2025-06-23 DIAGNOSIS — G89.29 CHRONIC THORACIC BACK PAIN, UNSPECIFIED BACK PAIN LATERALITY: ICD-10-CM

## 2025-06-23 DIAGNOSIS — M54.6 CHRONIC THORACIC BACK PAIN, UNSPECIFIED BACK PAIN LATERALITY: ICD-10-CM

## 2025-06-23 RX ORDER — TRAMADOL HYDROCHLORIDE 50 MG/1
50 TABLET ORAL 2 TIMES DAILY PRN
Qty: 60 TABLET | Refills: 0 | Status: SHIPPED | OUTPATIENT
Start: 2025-06-23 | End: 2025-07-23

## 2025-06-23 RX ORDER — LEVOTHYROXINE SODIUM 50 UG/1
50 TABLET ORAL
Qty: 90 TABLET | Refills: 1 | Status: SHIPPED | OUTPATIENT
Start: 2025-06-23

## 2025-06-23 NOTE — TELEPHONE ENCOUNTER
Medication and Quantity requested:     levothyroxine (SYNTHROID) 50 MCG tablet      And       traMADol (ULTRAM) 50 MG tablet     Last Visit  5/27/25    Pharmacy and phone number updated in EPIC:  yes  Na

## 2025-06-23 NOTE — TELEPHONE ENCOUNTER
Medication and Quantity requested:     levothyroxine (SYNTHROID) 50 MCG tablet  LAST FILLED ON 3/4/25 #90 0 REFILLS    And       traMADol (ULTRAM) 50 MG tablet LAST FILLED ON 3/18/25 #60 0 REFILLS    Last Visit  5/27/25    Pharmacy and phone number updated in EPIC:  yes  Na

## 2025-08-10 DIAGNOSIS — M54.6 CHRONIC THORACIC BACK PAIN, UNSPECIFIED BACK PAIN LATERALITY: ICD-10-CM

## 2025-08-10 DIAGNOSIS — G89.29 CHRONIC THORACIC BACK PAIN, UNSPECIFIED BACK PAIN LATERALITY: ICD-10-CM

## 2025-08-11 RX ORDER — TRAMADOL HYDROCHLORIDE 50 MG/1
50 TABLET ORAL 2 TIMES DAILY PRN
Qty: 60 TABLET | Refills: 0 | Status: SHIPPED | OUTPATIENT
Start: 2025-08-11 | End: 2025-09-10